# Patient Record
Sex: MALE | Race: WHITE | NOT HISPANIC OR LATINO | Employment: FULL TIME | URBAN - METROPOLITAN AREA
[De-identification: names, ages, dates, MRNs, and addresses within clinical notes are randomized per-mention and may not be internally consistent; named-entity substitution may affect disease eponyms.]

---

## 2017-10-17 ENCOUNTER — OFFICE VISIT (OUTPATIENT)
Dept: CARDIOLOGY | Facility: CLINIC | Age: 62
End: 2017-10-17

## 2017-10-17 VITALS
SYSTOLIC BLOOD PRESSURE: 120 MMHG | DIASTOLIC BLOOD PRESSURE: 72 MMHG | HEART RATE: 60 BPM | HEIGHT: 72 IN | BODY MASS INDEX: 27.22 KG/M2 | WEIGHT: 201 LBS

## 2017-10-17 DIAGNOSIS — I25.10 CORONARY ARTERY DISEASE INVOLVING NATIVE CORONARY ARTERY OF NATIVE HEART WITHOUT ANGINA PECTORIS: Primary | ICD-10-CM

## 2017-10-17 DIAGNOSIS — E78.5 HYPERLIPIDEMIA, UNSPECIFIED HYPERLIPIDEMIA TYPE: ICD-10-CM

## 2017-10-17 DIAGNOSIS — Z95.5 S/P DRUG ELUTING CORONARY STENT PLACEMENT: ICD-10-CM

## 2017-10-17 DIAGNOSIS — I10 ESSENTIAL HYPERTENSION: ICD-10-CM

## 2017-10-17 DIAGNOSIS — R06.81 APNEA: ICD-10-CM

## 2017-10-17 PROCEDURE — 99204 OFFICE O/P NEW MOD 45 MIN: CPT | Performed by: INTERNAL MEDICINE

## 2017-10-17 PROCEDURE — 93000 ELECTROCARDIOGRAM COMPLETE: CPT | Performed by: INTERNAL MEDICINE

## 2017-10-17 RX ORDER — CLOPIDOGREL BISULFATE 75 MG/1
75 TABLET ORAL
COMMUNITY
Start: 2017-04-23 | End: 2018-04-23

## 2017-10-17 RX ORDER — LISINOPRIL 40 MG/1
40 TABLET ORAL
COMMUNITY
End: 2022-04-02 | Stop reason: HOSPADM

## 2017-10-17 RX ORDER — METOPROLOL SUCCINATE 50 MG/1
50 TABLET, EXTENDED RELEASE ORAL DAILY
COMMUNITY
Start: 2017-04-10 | End: 2022-04-02 | Stop reason: HOSPADM

## 2017-10-17 RX ORDER — PREDNISONE 20 MG/1
20 TABLET ORAL DAILY
COMMUNITY
End: 2018-04-18 | Stop reason: ALTCHOICE

## 2017-10-17 RX ORDER — ATORVASTATIN CALCIUM 80 MG/1
80 TABLET, FILM COATED ORAL
COMMUNITY

## 2017-10-17 RX ORDER — AMLODIPINE BESYLATE 5 MG/1
5 TABLET ORAL
COMMUNITY
End: 2022-04-02 | Stop reason: HOSPADM

## 2017-10-17 RX ORDER — ASPIRIN 81 MG/1
81 TABLET ORAL
COMMUNITY
Start: 2017-01-16

## 2017-10-17 RX ORDER — NITROGLYCERIN 0.4 MG/1
0.4 TABLET SUBLINGUAL
COMMUNITY
End: 2022-05-25 | Stop reason: ALTCHOICE

## 2017-10-17 RX ORDER — PANTOPRAZOLE SODIUM 40 MG/1
1 TABLET, DELAYED RELEASE ORAL
COMMUNITY
Start: 2017-03-06

## 2017-10-17 NOTE — PROGRESS NOTES
Subjective:     Encounter Date:10/17/2017      Patient ID: Carlos Alan is a 61 y.o. male.    Chief Complaint:  History of Present Illness    This is a 61-year-old male with a history of coronary artery disease status post drug eluting stent placement of his mid right coronary artery in 3/2016, hyperlipidemia, hypertension, prior tobacco use, remote history of a single episode of atrial fibrillation, acid reflux, who presents to [facility] care.      The patient reports that he initially presented after having a couple episodes of significant dyspnea with routine exertional activities.  The symptoms were significant enough that he sought evaluation with Dr. Swann.  He reports he was subsequently referred to a cardiologist who recommended proceeding directly with cardiac catheterization.  He underwent cardiac catheterization on 3/22/2016 with Dr. Cox.  His coronary angiogram showed normal left main, no significant disease of the LAD with severe proximal calcification, normal appearing left circumflex artery, and a 99% mid right coronary artery stenosis with significant thrombus burden.  There is ARMEN 1-2 flow beyond the stenosis and there was retrograde filling of the PDA and YARELY branches from left to right collaterals.  He ultimately underwent drug-eluting stent placement with a Promus 4.0 x 20 mm stent.  He was started on Prasugrel following his intervention in addition to amlodipine for persistent hypertension.  An echocardiogram performed prior to that in 2/2016 showed normal left ventricular systolic function and wall motion with an ejection fraction of 60%, moderate mitral regurgitation, mild tricuspid regurgitation.    Following that the patient ended up switching his care to Dr. Au who we initially saw in 1/2017.  He then saw Dr. Robles in 4/2017 in follow-up at which time his Prasugrel was switched to clopidogrel.  No changes were made to his management at that time.    In addition to his history  of coronary artery disease he also reports a episode of atrial fibrillation that occurred about 20 years ago associated with symptoms of significant palpitations.  He's had no recurrent episodes that he is aware of since.  He also reports a significant family history of coronary artery disease including his father started having coronary disease in his 60s and a sister who had her first cardiac issues before the age of 60.  He admits to a unhealthy lifestyle prior to his diagnosis of coronary artery disease.  He reports that he smoked and drank a great deal.  He was also overweight and did not exercise.  He is since quit smoking.  He has lost a great deal of weight.  However he admits that he has room for improvement when it comes to diet and exercise.  He does not exercise on a regular basis.  He reports he does some light weight lifting but not any real aerobic activity.  He does report that he gets about 10,000 steps a day.  He otherwise feels well.  He denies any chest pain, shortness of breath, PND or orthopnea, presyncope or syncope, lower extremity edema, or palpitations.  He did have a recent lipid panel that showed a total cholesterol of 160, HDL of 73, triglycerides 136, and LDL of 60.  She has several patients today about diet, recommendations for exercise, and symptoms to look out for in the future for further cardiac events.  He also reports that he's been told by his significant other that he often appears to stop breathing while he is asleep.  He is interested in pursuing a sleep apnea evaluation.    Review of Systems   Constitution: Negative for weakness and malaise/fatigue.   HENT: Negative for hearing loss, hoarse voice, nosebleeds and sore throat.    Eyes: Negative for pain.   Cardiovascular: Negative for chest pain, claudication, cyanosis, dyspnea on exertion, irregular heartbeat, leg swelling, near-syncope, orthopnea, palpitations, paroxysmal nocturnal dyspnea and syncope.   Respiratory:  Negative for shortness of breath and snoring.    Endocrine: Negative for cold intolerance, heat intolerance, polydipsia, polyphagia and polyuria.   Skin: Negative for itching and rash.   Musculoskeletal: Negative for arthritis, falls, joint pain, joint swelling, muscle cramps, muscle weakness and myalgias.   Gastrointestinal: Negative for constipation, diarrhea, dysphagia, heartburn, hematemesis, hematochezia, melena, nausea and vomiting.   Genitourinary: Negative for frequency, hematuria and hesitancy.   Neurological: Negative for excessive daytime sleepiness, dizziness, headaches, light-headedness and numbness.   Psychiatric/Behavioral: Negative for depression. The patient is not nervous/anxious.           Current Outpatient Prescriptions:   •  amLODIPine (NORVASC) 5 MG tablet, Take 5 mg by mouth., Disp: , Rfl:   •  aspirin 81 MG EC tablet, Take 81 mg by mouth., Disp: , Rfl:   •  atorvastatin (LIPITOR) 80 MG tablet, Take 80 mg by mouth., Disp: , Rfl:   •  clopidogrel (PLAVIX) 75 MG tablet, Take 75 mg by mouth., Disp: , Rfl:   •  lisinopril (PRINIVIL,ZESTRIL) 40 MG tablet, Take 40 mg by mouth., Disp: , Rfl:   •  metoprolol succinate XL (TOPROL-XL) 50 MG 24 hr tablet, Take 50 mg by mouth., Disp: , Rfl:   •  nitroglycerin (NITROSTAT) 0.4 MG SL tablet, Place 0.4 mg under the tongue., Disp: , Rfl:   •  pantoprazole (PROTONIX) 40 MG EC tablet, Take 1 tablet by mouth., Disp: , Rfl:   •  predniSONE (DELTASONE) 20 MG tablet, Take 20 mg by mouth Daily., Disp: , Rfl:     Past Medical History:   Diagnosis Date   • Arrhythmia    • Benign hypertension    • CAD (coronary artery disease)     2/p Xience stent to mid RCA in 3/2016   • Hyperlipidemia      History reviewed. No pertinent surgical history.  Family History   Problem Relation Age of Onset   • Heart attack Father    • Heart attack Sister      Social History   Substance Use Topics   • Smoking status: Former Smoker   • Smokeless tobacco: None   • Alcohol use Yes  "          ECG 12 Lead  Date/Time: 10/17/2017 5:17 PM  Performed by: YUNIOR EMERY  Authorized by: YUNIOR EMERY   Comparison: compared with previous ECG   Similar to previous ECG  Rhythm: sinus rhythm               Objective:         Visit Vitals   • /72 (BP Location: Right arm, Patient Position: Sitting)  Comment: lft 118 70   • Pulse 60   • Ht 72\" (182.9 cm)   • Wt 201 lb (91.2 kg)   • BMI 27.26 kg/m2          Physical Exam   Constitutional: He is oriented to person, place, and time. He appears well-developed and well-nourished.   HENT:   Head: Normocephalic and atraumatic.   Eyes: Conjunctivae, EOM and lids are normal. Pupils are equal, round, and reactive to light.   Neck: Normal range of motion and full passive range of motion without pain. Neck supple. No JVD present. Carotid bruit is not present.   Cardiovascular: Normal rate, regular rhythm, S1 normal and S2 normal.  Exam reveals no gallop.    No murmur heard.  Pulses:       Radial pulses are 2+ on the right side, and 2+ on the left side.   No bilateral lower extremity edema   Pulmonary/Chest: Effort normal and breath sounds normal.   Abdominal: Soft. Normal appearance.   Lymphadenopathy:     He has no cervical adenopathy.   Neurological: He is alert and oriented to person, place, and time.   Skin: Skin is warm, dry and intact.   Psychiatric: He has a normal mood and affect.       Lab Review:       Assessment:          Diagnosis Plan   1. Coronary artery disease involving native coronary artery of native heart without angina pectoris  Ambulatory Referral to Sleep Medicine   2. S/P drug eluting coronary stent placement     3. Essential hypertension  Ambulatory Referral to Sleep Medicine   4. Hyperlipidemia, unspecified hyperlipidemia type     5. Apnea  Ambulatory Referral to Sleep Medicine          Plan:       1.  Coronary artery disease.  Prior drug-eluting stent placement to his mid right coronary artery.  He is not exhibiting any symptoms " suggestive of recurrent disease.  We spent 15-20 minutes going over the patient's questions and viewing his history.  I explained that if he were to have recurrent coronary artery disease that he will most likely present with similar symptoms as he had in the past.  We discussed increasing his aerobic activity by starting with walking.  I gave him a goal of exercising about 3 times a week at about 30 minutes at a time to start.  We also went over recommendations for a healthier diet.  Otherwise he appears to be on good medical management.  We'll continue his current medications.  2.  Hypertension.  Well-controlled on his current medications.  We'll continue the same.  3.  Hyperlipidemia.  Lipids are at goal on his high dose of atorvastatin.  We'll contain the same.  I did explain to the patient that if he is able to improve his diet and exercise and lose some weight and that there is a possibility we will be able to come down on the dose of his statin therapy.  4.  Reported nocturnal apneic episodes.  Patient reports that this is been noted by his significant other.  He is interested in pursuing evaluation for sleep apnea.  We'll refer him to sleep medicine.    Will see the patient back again in 6 months.

## 2018-01-17 ENCOUNTER — OFFICE VISIT (OUTPATIENT)
Dept: SLEEP MEDICINE | Facility: HOSPITAL | Age: 63
End: 2018-01-17
Attending: INTERNAL MEDICINE

## 2018-01-17 VITALS
BODY MASS INDEX: 27.63 KG/M2 | DIASTOLIC BLOOD PRESSURE: 77 MMHG | HEART RATE: 65 BPM | SYSTOLIC BLOOD PRESSURE: 138 MMHG | WEIGHT: 204 LBS | HEIGHT: 72 IN

## 2018-01-17 DIAGNOSIS — I25.10 CORONARY ARTERY DISEASE INVOLVING NATIVE HEART WITHOUT ANGINA PECTORIS, UNSPECIFIED VESSEL OR LESION TYPE: ICD-10-CM

## 2018-01-17 DIAGNOSIS — E66.3 OVERWEIGHT (BMI 25.0-29.9): ICD-10-CM

## 2018-01-17 DIAGNOSIS — G47.33 OSA (OBSTRUCTIVE SLEEP APNEA): ICD-10-CM

## 2018-01-17 DIAGNOSIS — I10 ESSENTIAL HYPERTENSION: ICD-10-CM

## 2018-01-17 DIAGNOSIS — R06.83 SNORING: Primary | ICD-10-CM

## 2018-01-17 PROCEDURE — G0463 HOSPITAL OUTPT CLINIC VISIT: HCPCS

## 2018-01-17 NOTE — PROGRESS NOTES
"            [facility]      Patient Care Team:  Usama Swann MD as PCP - General (Internal Medicine)    Referring Provider: Brittani Bentley MD    Chief complaint:   \"I was told he may have sleep apnea\"    History of present illness:    This is a 62-year-old male patient with history of CAD (S/P stent 2 years ago) and HTN (diagnosed 20 years ago) who presented for evaluation of snoring and possible sleep apnea.  He was told by his girlfriend that he may have sleep apnea because of his loud snoring.  She has told him that he stops breathing at night as well.  Patient denies awakening with snoring or gasping but reported occasional coughing and choking at night which has gotten better after he started Protonix.  He reported grinding his teeth at night.  Otherwise, he denies morning headache, dry throat, symptoms of RLS.  He did report daytime fatigue and occasional hypersomnolence especially when he's at work.  However, he attributed that to his \"boring job\".  He does work as a  and sit in his desk typing most of the time.    Sleep schedule:  -Bedtime: 8 PM  -Sleep latency: Few minutes  -Wake up time: 4:30 AM , does feel refreshed  -Nocturnal awakenin-2 times because of nocturia.  No difficulties going back to sleep.  -Perceived sleep hours: 8 on the weekdays and 10 on the weekends.      ESS: Total score: 4     Review of Systems  Constitutional: No fever or chills. No changes in appetite.   ENMT: No sinus congestion, postnasal drip, hoarsness  Cardiovascular: No chest pain but reported irregular heartbeats and swollen legs.  Respiratory: Dyspnea but no cough or wheezing.  (40 packs year smoker)  Gastrointestinal: No constipation, diarrhea, abdominal pain or acid reflux  Neurology: No headache, weakness, numbness or dizziness.   Musculoskeletal: No joints pain, stiffness or swelling.   Psychiatry: No depression, anxiety or irritability.   Hem/Lymphatic: No swollen glands " "or easy bruising.  Integumentary: No rash.  Endocrinology: No excessive thirst, cold or warm intolerance.   Urinary: No dysuria, bloody urine or frequent urination.     History  Past Medical History:   Diagnosis Date   • Arrhythmia    • Benign hypertension    • CAD (coronary artery disease)     2/p Xience stent to mid RCA in 3/2016   • Hyperlipidemia    , No past surgical history on file.,   Family History   Problem Relation Age of Onset   • Heart attack Father    • Heart attack Sister    ,   Social History   Substance Use Topics   • Smoking status: Former Smoker   • Smokeless tobacco: Not on file   • Alcohol use Yes    and Allergies:  Review of patient's allergies indicates no known allergies.    Medications:    Current Outpatient Prescriptions:   •  amLODIPine (NORVASC) 5 MG tablet, Take 5 mg by mouth., Disp: , Rfl:   •  aspirin 81 MG EC tablet, Take 81 mg by mouth., Disp: , Rfl:   •  atorvastatin (LIPITOR) 80 MG tablet, Take 80 mg by mouth., Disp: , Rfl:   •  clopidogrel (PLAVIX) 75 MG tablet, Take 75 mg by mouth., Disp: , Rfl:   •  lisinopril (PRINIVIL,ZESTRIL) 40 MG tablet, Take 40 mg by mouth., Disp: , Rfl:   •  metoprolol succinate XL (TOPROL-XL) 50 MG 24 hr tablet, Take 50 mg by mouth., Disp: , Rfl:   •  nitroglycerin (NITROSTAT) 0.4 MG SL tablet, Place 0.4 mg under the tongue., Disp: , Rfl:   •  pantoprazole (PROTONIX) 40 MG EC tablet, Take 1 tablet by mouth., Disp: , Rfl:   •  predniSONE (DELTASONE) 20 MG tablet, Take 20 mg by mouth Daily., Disp: , Rfl:       Vital Signs:  Vitals:    01/17/18 1552   BP: 138/77   BP Location: Left arm   Patient Position: Sitting   Pulse: 65   Weight: 92.5 kg (204 lb)   Height: 182.9 cm (72\")     Body mass index is 27.67 kg/(m^2).  Neck Circumference: 16 inches     Physical Exam:  Neck Circumference: 16 inches     Constitutional: Not in acute distress.  Eyes: Injected conjunctiva, EOMI.  ENMT: Ly score 4. Mallampati score 4.  Neck: Large.    Heart: Regular rhythm and " rate, no murmur  Lungs: Good and equal air entry bilaterally. No crackles or wheezing.         Abdomen: Obese.   Extremities: No cyanosis, clubbing or pitting edema. Moves all extremities.  Neuro: Conscious, alert, oriented x3.   Psych: Appropriate mood and affect.    Integumentary: No rash  Lymphatic: No palpable cervical or supraclavicular lymph nodes.        Assessment:  Diagnoses and all orders for this visit:    Snoring    Essential hypertension    Overweight (BMI 25.0-29.9)    Coronary artery disease involving native heart without angina pectoris, unspecified vessel or lesion type    Overall, patient is at moderate risk for obstructive sleep apnea due to his upper airway anatomy.  He does have comorbid hypertension and coronary artery disease.    Plan:  I explained the pathophysiology of obstructive sleep apnea, testing and therapy.  We discussed treatment with CPAP and oral appliance in addition to weight loss.  Patient is agreeable with therapy.  Will proceed with home sleep apnea testing and likely CPAP treatment if his sleep apnea is moderate to severe, otherwise he will have to return to the clinic to discuss treatment with oral appliance versus CPAP.    I counseled the patient for weight loss.  I informed him that losing weight may decrease the severity of sleep apnea or obesity need of CPAP therapy.    Also discussed estrogen between obstructive sleep apnea and cardiovascular disease and the beneficial effect of CPAP therapy in reducing the risk of cardiovascular events.        Lakeisha Weston MD  01/17/18  5:03 PM

## 2018-01-29 ENCOUNTER — HOSPITAL ENCOUNTER (OUTPATIENT)
Dept: SLEEP MEDICINE | Facility: HOSPITAL | Age: 63
Discharge: HOME | End: 2018-01-29
Attending: INTERNAL MEDICINE | Admitting: INTERNAL MEDICINE

## 2018-01-29 DIAGNOSIS — G47.33 OSA (OBSTRUCTIVE SLEEP APNEA): ICD-10-CM

## 2018-01-29 PROCEDURE — 95806 SLEEP STUDY UNATT&RESP EFFT: CPT

## 2018-02-06 ENCOUNTER — TELEPHONE (OUTPATIENT)
Dept: SLEEP MEDICINE | Facility: HOSPITAL | Age: 63
End: 2018-02-06

## 2018-02-28 ENCOUNTER — OFFICE VISIT (OUTPATIENT)
Dept: SLEEP MEDICINE | Facility: HOSPITAL | Age: 63
End: 2018-02-28
Attending: INTERNAL MEDICINE

## 2018-02-28 VITALS
OXYGEN SATURATION: 97 % | BODY MASS INDEX: 27.83 KG/M2 | DIASTOLIC BLOOD PRESSURE: 69 MMHG | WEIGHT: 205.5 LBS | TEMPERATURE: 98.9 F | HEIGHT: 72 IN | HEART RATE: 68 BPM | SYSTOLIC BLOOD PRESSURE: 107 MMHG

## 2018-02-28 DIAGNOSIS — G47.33 OBSTRUCTIVE SLEEP APNEA, ADULT: Primary | ICD-10-CM

## 2018-02-28 PROCEDURE — G0463 HOSPITAL OUTPT CLINIC VISIT: HCPCS

## 2018-02-28 NOTE — PROGRESS NOTES
"                      [facility]                          Chief Complaint:   Follow up for the result of the sleep study    History of present illness:   This is a 62-year-old male patient with a history of CAD and HTN.  He was last seen in January for concerns about sleep apnea given his CAD history.  He is here today for follow-up on the result of the sleep test.  He continues to report snoring which disturbs his bed partner.  Otherwise, his symptoms has not changed.    Sleep schedule:  -Bedtime: 10 PM  -Wake up time: 5 AM , does feel refreshed  -Nocturnal awakening: Multiple times because of unknown reason.  No difficulties going back to sleep.  -Perceived sleep hours: 6      ESS: Total score: 4         Medication Review:     Current Outpatient Prescriptions:   •  amLODIPine (NORVASC) 5 MG tablet, Take 5 mg by mouth., Disp: , Rfl:   •  aspirin 81 MG EC tablet, Take 81 mg by mouth., Disp: , Rfl:   •  atorvastatin (LIPITOR) 80 MG tablet, Take 80 mg by mouth., Disp: , Rfl:   •  clopidogrel (PLAVIX) 75 MG tablet, Take 75 mg by mouth., Disp: , Rfl:   •  lisinopril (PRINIVIL,ZESTRIL) 40 MG tablet, Take 40 mg by mouth., Disp: , Rfl:   •  metoprolol succinate XL (TOPROL-XL) 50 MG 24 hr tablet, Take 50 mg by mouth., Disp: , Rfl:   •  nitroglycerin (NITROSTAT) 0.4 MG SL tablet, Place 0.4 mg under the tongue., Disp: , Rfl:   •  pantoprazole (PROTONIX) 40 MG EC tablet, Take 1 tablet by mouth., Disp: , Rfl:   •  predniSONE (DELTASONE) 20 MG tablet, Take 20 mg by mouth Daily., Disp: , Rfl:         Vital Signs:  Vitals:    02/28/18 1536   BP: 107/69   BP Location: Left arm   Patient Position: Sitting   Cuff Size: Adult   Pulse: 68   Temp: 98.9 °F (37.2 °C)   TempSrc: Oral   SpO2: 97%   Weight: 93.2 kg (205 lb 8 oz)   Height: 182.9 cm (72\")     Body mass index is 27.87 kg/(m^2).  Neck Circumference: 15.5 inches       Physical Exam:   General Appearance:    Alert, cooperative, in no acute " distress   HEENT:  Ly score 4. Mallampati score 4.   Lungs:     Clear to auscultation,respirations regular, even and                  unlabored    Heart:    Regular rhythm and normal rate, normal S1 and S2, no            murmur, no gallop, no rub, no click   Abdomen:     Normal bowel sounds, no masses, no organomegaly, soft        non-tender, non-distended, no guarding, no rebound                tenderness   Neuro:   Conscious, alert, oriented x3. Appropriate mood and affect.    Extremities:   Moves all extremities well, no edema, no cyanosis, no             Redness              Diagnostic data:  HST was performed on: 1/30/18  TRT (total recording time)= 497 min; MT (monitoring time)= 483 min  Patient experienced 26 obstructive apnea, 0 central apnea and 35 hypopneas resulting in total SHAMEKA: 7.6 events/h. Supine time was 103 minutes resulting in Supine SHAMEKA: 13.9 events/hour.  LUCA=4.8 events/h; [place] SpO2=86 % and hypoxic burden: 1.2 min.         ASSESSMENT:      Diagnoses and all orders for this visit:    Obstructive sleep apnea, adult      PLAN:  I explained the result of the sleep study in details.  I offered treatment with CPAP versus oral appliance due to his comorbid coronary artery disease and hypertension.  I explained the data about the association between sleep apnea and coronary artery disease and major cardiovascular events.  Patient would like to try weight loss and stop alcohol rather than using CPAP or oral appliance.  I recommended to increase his efforts to lose weight.  We will follow him as needed.                This note was dictated utilizing listedplaceson dictation

## 2018-04-18 ENCOUNTER — OFFICE VISIT (OUTPATIENT)
Dept: CARDIOLOGY | Facility: CLINIC | Age: 63
End: 2018-04-18

## 2018-04-18 VITALS
DIASTOLIC BLOOD PRESSURE: 68 MMHG | HEART RATE: 61 BPM | BODY MASS INDEX: 27.9 KG/M2 | HEIGHT: 72 IN | WEIGHT: 206 LBS | SYSTOLIC BLOOD PRESSURE: 118 MMHG

## 2018-04-18 DIAGNOSIS — I25.10 CORONARY ARTERY DISEASE INVOLVING NATIVE CORONARY ARTERY OF NATIVE HEART WITHOUT ANGINA PECTORIS: Primary | ICD-10-CM

## 2018-04-18 DIAGNOSIS — I48.0 PAROXYSMAL ATRIAL FIBRILLATION (HCC): ICD-10-CM

## 2018-04-18 PROCEDURE — 99215 OFFICE O/P EST HI 40 MIN: CPT | Performed by: INTERNAL MEDICINE

## 2018-04-18 PROCEDURE — 93000 ELECTROCARDIOGRAM COMPLETE: CPT | Performed by: INTERNAL MEDICINE

## 2018-09-25 ENCOUNTER — OFFICE VISIT (OUTPATIENT)
Dept: CARDIOLOGY | Facility: CLINIC | Age: 63
End: 2018-09-25

## 2018-09-25 VITALS
SYSTOLIC BLOOD PRESSURE: 152 MMHG | WEIGHT: 205 LBS | HEART RATE: 58 BPM | DIASTOLIC BLOOD PRESSURE: 104 MMHG | BODY MASS INDEX: 27.77 KG/M2 | HEIGHT: 72 IN

## 2018-09-25 DIAGNOSIS — I10 ESSENTIAL HYPERTENSION: Primary | ICD-10-CM

## 2018-09-25 DIAGNOSIS — E83.42 HYPOMAGNESEMIA: ICD-10-CM

## 2018-09-25 DIAGNOSIS — E78.5 HYPERLIPIDEMIA, UNSPECIFIED HYPERLIPIDEMIA TYPE: ICD-10-CM

## 2018-09-25 DIAGNOSIS — I25.10 CORONARY ARTERY DISEASE INVOLVING NATIVE CORONARY ARTERY OF NATIVE HEART WITHOUT ANGINA PECTORIS: ICD-10-CM

## 2018-09-25 DIAGNOSIS — R00.2 PALPITATIONS: ICD-10-CM

## 2018-09-25 PROCEDURE — 93000 ELECTROCARDIOGRAM COMPLETE: CPT | Performed by: NURSE PRACTITIONER

## 2018-09-25 PROCEDURE — 99214 OFFICE O/P EST MOD 30 MIN: CPT | Performed by: NURSE PRACTITIONER

## 2018-09-25 RX ORDER — AMOXICILLIN 500 MG/1
CAPSULE ORAL
COMMUNITY
Start: 2018-06-19 | End: 2019-04-22

## 2018-09-25 RX ORDER — CLOPIDOGREL BISULFATE 75 MG/1
TABLET ORAL
COMMUNITY
Start: 2018-07-10 | End: 2020-02-06

## 2018-09-25 RX ORDER — CLOPIDOGREL BISULFATE 75 MG/1
75 TABLET ORAL
COMMUNITY
Start: 2017-04-23 | End: 2018-09-25 | Stop reason: SDUPTHER

## 2018-09-25 NOTE — PROGRESS NOTES
Date of Office Visit: 2018  Encounter Provider: SIMON Aaron  Place of Service: [facility] CARDIOLOGY  Patient Name: Carlos Alan  :1955      Subjective:     Chief Complaint: ED follow up for palpitations      History of Present Illness:  Carlos Alan is a pleasant 62 y.o. male who is new to me.  Outside records have been obtained and reviewed by me.  The patient has a past medical history of hypertension, single episode of atrial fibrillation, coronary artery disease, hyperlipidemia, former smoker and alcohol use.     The patient was last seen in the office on 18 by Dr. Carpio.  He was previously followed by Dr. Chapa from our practice but decided he wanted to see Dr. Carpio for second opinion.  At his visit with Dr. Carpio he reported that he has a stressful life.  He is a former smoker but quit in .  He reports he drinks alcohol daily.  He reported he has a family history of heart disease (father and sister).  He reports he gets gout a few times a year.  He also reports he is treated by his PCP for hypertension and hyperlipidemia.  At this visit it was noted he has had an episode of transient atrial fibrillation that occurred once and he has not had any recurrence since.  He also has a cardiac history of coronary artery disease status post stenting of a critical mid RCA lesion in 2016.  He also had calcification of his LAD without significant stenosis.  At this visit with Dr. Carpio, there was a lengthy discussion about coronary prevention including lifestyle and medication treatments.  Dr. Carpoi stated that given his high level physical activity that he was reassured that he did not think he was likely suffering from significant ischemia currently.  Dr. Carpio also did not feel that the patient needed to have any anticoagulation as he had a single episode of atrial fibrillation in the past with no recurrence.  He recommended that he see Dr. Carpio on a once yearly  basis or sooner if symptoms warranted.    The patient is here today for follow-up because he was recently in the emergency department for palpitations on 9/13/18.  He reported that around 2 AM when he woke up to the bathroom he rolled onto his left side and he can feel his heart rate. His heart monitor read that his heart rate was between 96 and 97 and showed no A. Fib.  He eased back down and fell back asleep.  He woke back up on his usual time at 5 AM and couldn't feel his pulse and his head.  His heart rate was around  bpm and this device read that his heart rate was unclassified.  This is when he proceeded to go to the emergency department at [facility].  After presenting to the ED he had no further palpitations or elevations in his heart rate.  He denied any chest pain, shortness of breath, headache, dizziness, exertional symptoms, fever, chills, cough, congestion, nausea, vomiting or diarrhea.  His troponin was negative and his lab workup was essentially unremarkable except for a mildly low magnesium level of 1.39 (1.40).  It also showed a mild anemia H&H 12.6 and 38.2.  No arrhythmias in the ED.  He was discharged home with instructions to follow-up with his cardiologist.  The patient reports that he has also been having some elevated blood pressure readings as well as office visits.  His blood pressure in the ED was 150/76.  His heart rate was 80s and sinus rhythm.  He reports he doesn't check his blood pressure at home often but lately when he has it's been running 150s over 80s to 90s where before it was always staying about 120s over 80s.  He also states that he was worried because prior to his last stress test which resulted in stent placement in his blood pressure was starting to get elevated.  He also mentioned that although he does exercise frequently he does not try to exert himself for long periods of time and that while he was babysitting a few weeks ago he did some running around the  bed and started to sweat a little bit which she did not feel like his level of activity should have caused mild sweating.  He has had no recurrence of the sweating episodes since.  He walks 2 times a week every other week where he gets his heart rate to 110 bpm for about 10 minutes.  He does not have any chest pain, abnormal diaphoresis, shortness of breath or dizziness with this activity.  Also, the patient reported that he had a sleep study about 6 months ago which reported borderline sleep apnea.  It was discussed with the sleep medicine physician that the patient cut back on his drinking and lost about 20 pounds that he would not need a CPAP.  However the patient reported that he really has not done a great job of cutting back on his alcohol intake or trying to lose the weight.  He reports he drinks a glass of wine daily and at approximately 6 drinks of hard liquor per day on the weekend.  He also reports his caffeine intake is limited to maybe 1 soft drink soda per week.      Past Medical History:   Diagnosis Date   • Arrhythmia    • Benign hypertension    • CAD (coronary artery disease)     2/p Xience stent to mid RCA in 3/2016   • Coronary artery disease involving native coronary artery of native heart without angina pectoris    • Hyperlipidemia    • S/P drug eluting coronary stent placement      History reviewed. No pertinent surgical history.  Outpatient Medications Prior to Visit   Medication Sig Dispense Refill   • amLODIPine (NORVASC) 5 MG tablet Take 5 mg by mouth.     • aspirin 81 MG EC tablet Take 81 mg by mouth.     • atorvastatin (LIPITOR) 80 MG tablet Take 80 mg by mouth.     • lisinopril (PRINIVIL,ZESTRIL) 40 MG tablet Take 40 mg by mouth.     • metoprolol succinate XL (TOPROL-XL) 50 MG 24 hr tablet Take 50 mg by mouth.     • nitroglycerin (NITROSTAT) 0.4 MG SL tablet Place 0.4 mg under the tongue.     • pantoprazole (PROTONIX) 40 MG EC tablet Take 1 tablet by mouth.       No facility-administered  medications prior to visit.        Allergies as of 09/25/2018   • (No Known Allergies)     Social History     Social History   • Marital status: Single     Spouse name: N/A   • Number of children: N/A   • Years of education: N/A     Occupational History   • Not on file.     Social History Main Topics   • Smoking status: Former Smoker   • Smokeless tobacco: Not on file   • Alcohol use 8.4 oz/week     7 Glasses of wine, 7 Shots of liquor per week      Comment: red wine daily and cocktails on the weekends   • Drug use: No   • Sexual activity: Defer     Other Topics Concern   • Not on file     Social History Narrative   • No narrative on file     Family History   Problem Relation Age of Onset   • Heart attack Father    • Heart attack Sister      Review of Systems   Constitution: Negative for chills, fever and malaise/fatigue.   HENT: Negative for ear pain, hearing loss, nosebleeds and sore throat.    Eyes: Negative for double vision, pain, vision loss in left eye and vision loss in right eye.   Cardiovascular: Negative for chest pain, claudication, dyspnea on exertion, irregular heartbeat, leg swelling, near-syncope, orthopnea, palpitations, paroxysmal nocturnal dyspnea and syncope.   Respiratory: Positive for snoring. Negative for cough, shortness of breath and wheezing.    Endocrine: Negative for cold intolerance and heat intolerance.   Hematologic/Lymphatic: Negative for bleeding problem.   Skin: Negative for color change, itching, rash and unusual hair distribution.   Musculoskeletal: Negative for joint pain and joint swelling.   Gastrointestinal: Negative for abdominal pain, diarrhea, hematochezia, melena, nausea and vomiting.   Genitourinary: Negative for decreased libido, frequency, hematuria, hesitancy and incomplete emptying.   Neurological: Negative for excessive daytime sleepiness, dizziness, headaches, light-headedness, loss of balance, numbness, paresthesias and seizures.   Psychiatric/Behavioral: Negative  "for depression.          Objective:     Vitals:    09/25/18 1348   BP: (!) 152/104   BP Location: Left arm   Patient Position: Sitting   Pulse: 58   Weight: 93 kg (205 lb)   Height: 182.9 cm (72\")     Body mass index is 27.8 kg/m².    PHYSICAL EXAM:  Physical Exam   Constitutional: He is oriented to person, place, and time. He appears well-developed and well-nourished. No distress.   HENT:   Head: Normocephalic and atraumatic.   Eyes: Pupils are equal, round, and reactive to light. Right eye exhibits abnormal extraocular motion.   Neck: Neck supple. No JVD present. Carotid bruit is not present.   Cardiovascular: Regular rhythm, S1 normal, S2 normal, normal heart sounds and intact distal pulses.  Bradycardia present.  Exam reveals no friction rub.    No murmur heard.  Pulses:       Radial pulses are 2+ on the right side, and 2+ on the left side.   Pulmonary/Chest: Effort normal and breath sounds normal. No accessory muscle usage. No respiratory distress. He has no decreased breath sounds. He has no wheezes. He has no rhonchi. He has no rales. He exhibits no tenderness.   Abdominal: Soft. Bowel sounds are normal. He exhibits no distension. There is no splenomegaly or hepatomegaly. There is no tenderness. There is no rebound.   Overweight   Musculoskeletal: Normal range of motion. He exhibits no edema.   Neurological: He is alert and oriented to person, place, and time.   Skin: Skin is warm, dry and intact. No bruising and no rash noted. He is not diaphoretic. No erythema.   Psychiatric: He has a normal mood and affect. His speech is normal and behavior is normal. Judgment and thought content normal. Cognition and memory are normal.   Nursing note and vitals reviewed.        ECG 12 Lead  Date/Time: 9/25/2018 2:04 PM  Performed by: WAYNE DOE  Authorized by: WAYNE DOE   Comparison: compared with previous ECG from 4/18/2018  Similar to previous ECG  Rhythm: sinus bradycardia  Rate: bradycardic  BPM: " 58  Clinical impression: normal ECG  Comments: Otherwise normal ECG  Indication: CAD, HTN            Assessment:       Diagnosis Plan   1. Essential hypertension     2. Palpitations     3. Coronary artery disease involving native coronary artery of native heart without angina pectoris     4. Hyperlipidemia, unspecified hyperlipidemia type     5. Hypomagnesemia         Plan:     1. Hypertension: B/P 151/104 in office today - denies chest pain, headache or visual changes. B/P running in the 150s systolic and 90s diastolic at home and at other physician office.  I will increase Amlodipine to 10 mg daily and Continue the rest of meds the same. He reports he will have his PCP order his increased dose of Amlodipine when he finishes the 5 mg tablets. His HR is 58 today. If we need further med titration for hypertension management. I will consider increasing his Toprol XL dose to 75 mg daily and see if this prevents any recurrence of palpitations. I gave the patient a b/p log to check is b/p and bring it in to his follow up visit with me in 6 weeks to reassess his symptoms and see if his b/p has improved with his increased dose of Amlodipine. I also advised the patient that he should cut back on his ETOH intake and sodium intake for b/p reduction. I also advised the patient that exercising and weight reduction are important factors in improving hypertension. The patient demonstrated understanding. I told him if his borderline NED continued to be left untreated this could also worsen his hypertension. He again demonstrated understanding.     2.   Palpitations: Had episode that prompted him to go to [facility] on 9/13/18. Trop was negative. Was not in A-fib. Was d/c home to follow up with cardiology and for possible Holter. Has had no recurrence since. I discussed the possibility of Holter monitor, Ziopatch, Linq implantation for detection of arrhythmias or atrial fibrillation.  I also discussed watchful waiting  with the patient.  At this time the patient opted for watchful waiting. If we proceed with monitoring in the future- per pre-certification department the patient will need a holter monitor before a ziopatch.    3. Coronary Artery Disease  Assessment  • The patient has no angina    Subjective - Objective  • Current antiplatelet therapy includes aspirin 81 mg and clopidogrel 75 mg    I do not feel like the patient needs an ischemic work up at this time based on his activity level and no other anginal symptoms aside from one isolated episode of mild sweating without chest pain or shortness of breath. His EKG is normal and troponin was negative on 9/13/18 in the ED.     4. Hyperlipidemia- On Lipitor 80 mg daily. Managed my Dr. Swann his PCP. Reports he had labs in July 2018. I do not have these results.    5. Hypomagnesemia- Mg level mildly low in ED at [facility] on 9/13/18 was 1.39 (1.40). The patient has not yet followed up with his PCP. I advised him to schedule follow up with his PCP after his ED visit for recheck of labs.       Follow up with SIMON Stout in 6 weeks, unless otherwise needed sooner.  I advised the patient to contact our office with any questions or concerns.         Your medication list          Accurate as of 9/25/18  3:16 PM. If you have any questions, ask your nurse or doctor.               CHANGE how you take these medications      Instructions Last Dose Given Next Dose Due   clopidogrel 75 MG tablet  Commonly known as:  PLAVIX  What changed:  Another medication with the same name was removed. Continue taking this medication, and follow the directions you see here.  Changed by:  SIMON Aaron              CONTINUE taking these medications      Instructions Last Dose Given Next Dose Due   amLODIPine 5 MG tablet  Commonly known as:  NORVASC      Take 5 mg by mouth.       amoxicillin 500 MG capsule  Commonly known as:  AMOXIL           aspirin 81 MG EC tablet      Take 81 mg by  mouth.       atorvastatin 80 MG tablet  Commonly known as:  LIPITOR      Take 80 mg by mouth.       lisinopril 40 MG tablet  Commonly known as:  PRINIVIL,ZESTRIL      Take 40 mg by mouth.       metoprolol succinate XL 50 MG 24 hr tablet  Commonly known as:  TOPROL-XL      Take 50 mg by mouth.       nitroglycerin 0.4 MG SL tablet  Commonly known as:  NITROSTAT      Place 0.4 mg under the tongue.       pantoprazole 40 MG EC tablet  Commonly known as:  PROTONIX      Take 1 tablet by mouth.              The above medication changes may not have been made by this provider.  Medication list was updated to reflect medications patient is currently taking including medication changes and discontinuations made by other healthcare providers.       I have reviewed this case with Dr. Carpio. It has been a pleasure to participate in this patient's care. Please feel free to contact me with any questions or concerns.     Radha Gruber, SIMON  09/25/2018       Dictated utilizing Dragon Dictation System.

## 2019-04-22 ENCOUNTER — OFFICE VISIT (OUTPATIENT)
Dept: CARDIOLOGY | Facility: CLINIC | Age: 64
End: 2019-04-22

## 2019-04-22 VITALS
HEART RATE: 66 BPM | HEIGHT: 72 IN | SYSTOLIC BLOOD PRESSURE: 130 MMHG | DIASTOLIC BLOOD PRESSURE: 74 MMHG | BODY MASS INDEX: 27.77 KG/M2 | WEIGHT: 205 LBS

## 2019-04-22 DIAGNOSIS — I48.0 PAROXYSMAL ATRIAL FIBRILLATION (HCC): Primary | ICD-10-CM

## 2019-04-22 DIAGNOSIS — I25.10 CORONARY ARTERY DISEASE INVOLVING NATIVE CORONARY ARTERY OF NATIVE HEART WITHOUT ANGINA PECTORIS: ICD-10-CM

## 2019-04-22 PROCEDURE — 99213 OFFICE O/P EST LOW 20 MIN: CPT | Performed by: INTERNAL MEDICINE

## 2019-04-22 PROCEDURE — 93000 ELECTROCARDIOGRAM COMPLETE: CPT | Performed by: INTERNAL MEDICINE

## 2019-04-30 ENCOUNTER — TELEPHONE (OUTPATIENT)
Dept: CARDIOLOGY | Facility: CLINIC | Age: 64
End: 2019-04-30

## 2019-05-01 NOTE — TELEPHONE ENCOUNTER
Per verbal from Dr. Carpio, patient is clear for surgery and ok to hold aspirin and plavix for 5 days prior.    Patient has been informed and S/C form along with EKG faxed to [phone].

## 2019-10-29 ENCOUNTER — ON CAMPUS - OUTPATIENT (OUTPATIENT)
Dept: URBAN - METROPOLITAN AREA HOSPITAL 134 | Facility: HOSPITAL | Age: 64
End: 2019-10-29

## 2019-10-29 DIAGNOSIS — K62.5 HEMORRHAGE OF ANUS AND RECTUM: ICD-10-CM

## 2019-10-29 DIAGNOSIS — K57.32 DIVERTICULITIS OF LARGE INTESTINE WITHOUT PERFORATION OR ABS: ICD-10-CM

## 2019-10-29 DIAGNOSIS — K64.8 OTHER HEMORRHOIDS: ICD-10-CM

## 2019-10-29 DIAGNOSIS — D12.4 BENIGN NEOPLASM OF DESCENDING COLON: ICD-10-CM

## 2019-10-29 PROCEDURE — 45385 COLONOSCOPY W/LESION REMOVAL: CPT

## 2019-11-14 ENCOUNTER — OFFICE (OUTPATIENT)
Dept: URBAN - METROPOLITAN AREA CLINIC 66 | Facility: CLINIC | Age: 64
End: 2019-11-14

## 2019-11-14 VITALS
DIASTOLIC BLOOD PRESSURE: 80 MMHG | SYSTOLIC BLOOD PRESSURE: 119 MMHG | HEART RATE: 63 BPM | HEIGHT: 72 IN | WEIGHT: 200 LBS

## 2019-11-14 DIAGNOSIS — I10 ESSENTIAL (PRIMARY) HYPERTENSION: ICD-10-CM

## 2019-11-14 DIAGNOSIS — K64.8 OTHER HEMORRHOIDS: ICD-10-CM

## 2019-11-14 DIAGNOSIS — K92.1 MELENA: ICD-10-CM

## 2019-11-14 DIAGNOSIS — K21.9 GASTRO-ESOPHAGEAL REFLUX DISEASE WITHOUT ESOPHAGITIS: ICD-10-CM

## 2019-11-14 PROCEDURE — 99214 OFFICE O/P EST MOD 30 MIN: CPT

## 2020-02-06 ENCOUNTER — OFFICE VISIT (OUTPATIENT)
Dept: CARDIOLOGY | Facility: CLINIC | Age: 65
End: 2020-02-06

## 2020-02-06 VITALS
HEART RATE: 66 BPM | WEIGHT: 201 LBS | DIASTOLIC BLOOD PRESSURE: 86 MMHG | SYSTOLIC BLOOD PRESSURE: 130 MMHG | BODY MASS INDEX: 27.22 KG/M2 | HEIGHT: 72 IN

## 2020-02-06 DIAGNOSIS — I48.0 PAROXYSMAL ATRIAL FIBRILLATION (HCC): ICD-10-CM

## 2020-02-06 DIAGNOSIS — I25.10 CORONARY ARTERY DISEASE INVOLVING NATIVE CORONARY ARTERY OF NATIVE HEART WITHOUT ANGINA PECTORIS: Primary | ICD-10-CM

## 2020-02-06 DIAGNOSIS — I10 ESSENTIAL HYPERTENSION: ICD-10-CM

## 2020-02-06 DIAGNOSIS — E78.2 MIXED HYPERLIPIDEMIA: ICD-10-CM

## 2020-02-06 PROCEDURE — 99214 OFFICE O/P EST MOD 30 MIN: CPT | Performed by: INTERNAL MEDICINE

## 2020-02-06 PROCEDURE — 93000 ELECTROCARDIOGRAM COMPLETE: CPT | Performed by: INTERNAL MEDICINE

## 2020-02-06 RX ORDER — MULTIVITAMIN WITH IRON
TABLET ORAL
COMMUNITY
Start: 2019-12-09 | End: 2022-05-25 | Stop reason: SDUPTHER

## 2020-02-06 RX ORDER — PNV NO.95/FERROUS FUM/FOLIC AC 28MG-0.8MG
TABLET ORAL
COMMUNITY
Start: 2019-12-10

## 2020-02-06 NOTE — PROGRESS NOTES
[facility] Follow Up Office Note     Encounter Date:20  Patient:Carlos Alan  :1955  MRN:[id number]      Chief Complaint: Follow-up coronary artery disease    Chief Complaint   Patient presents with   • Coronary Artery Disease     1 year f/u    • Hypertension     History of Presenting Illness:      Mr. Alan is a 64-year-old gentleman with past medical history notable for coronary artery disease status post percutaneous intervention, hypertension, mixed hyperlipidemia, and remote history of paroxysmal atrial fibrillation who presents to our office for scheduled follow-up.  Patient was previously following with Dr. Carpio and was last seen by him approximately 1 year ago.  He presents today for his scheduled one-year follow-up and his first visit with myself.    In general he has been doing quite well.  At his last office visit no changes were needed at that time.  And he was doing well.  He had no symptoms of paroxysmal atrial fibrillation or recurrent anginal symptoms.  He was tolerating all of his medical regimen including his dual antiplatelet therapy at that time.  In general he has done quite well since then.  He did unfortunately have an episode of rectal bleeding back in 2019 and at that time his clopidogrel was discontinued appropriately.  Since then he has had no recurrent bleeding issues.  He is also denied any recurrent anginal symptoms.    With regards to his paroxysmal atrial fibrillation this was initially found and diagnosed back in  and was relatively self-limited.  He had only had 2 episodes in his life 1 was actually captured on a stress test.  He has had no recurrent symptoms that he is aware of since then.  He does use a home monitor in which he can check his rhythm at home if he feels any abnormal heart rhythms.  He does have occasional short and brief palpitations but nothing sustained.  He denies any TIA or strokelike symptoms.        Review of  Systems:  Review of Systems   Constitution: Negative.   HENT: Negative.    Eyes: Negative.    Cardiovascular: Negative.    Respiratory: Negative.    Endocrine: Negative.    Hematologic/Lymphatic: Positive for bleeding problem.   Skin: Negative.    Musculoskeletal: Negative.    Gastrointestinal: Negative.    Genitourinary: Negative.    Neurological: Negative.    Psychiatric/Behavioral: Negative.    Allergic/Immunologic: Negative.        Current Outpatient Medications on File Prior to Visit   Medication Sig Dispense Refill   • amLODIPine (NORVASC) 5 MG tablet Take 5 mg by mouth.     • aspirin 81 MG EC tablet Take 81 mg by mouth.     • atorvastatin (LIPITOR) 80 MG tablet Take 80 mg by mouth.     • B Complex-C (B-COMPLEX WITH VITAMIN C) tablet      • ferrous sulfate 325 (65 Fe) MG tablet      • lisinopril (PRINIVIL,ZESTRIL) 40 MG tablet Take 40 mg by mouth.     • metoprolol succinate XL (TOPROL-XL) 50 MG 24 hr tablet Take 50 mg by mouth.     • nitroglycerin (NITROSTAT) 0.4 MG SL tablet Place 0.4 mg under the tongue.     • pantoprazole (PROTONIX) 40 MG EC tablet Take 1 tablet by mouth.     • [DISCONTINUED] clopidogrel (PLAVIX) 75 MG tablet        No current facility-administered medications on file prior to visit.        No Known Allergies    Past Medical History:   Diagnosis Date   • Arrhythmia    • Benign hypertension    • CAD (coronary artery disease)     2/p Xience stent to mid RCA in 3/2016   • Coronary artery disease involving native coronary artery of native heart without angina pectoris    • Hyperlipidemia    • S/P drug eluting coronary stent placement        History reviewed. No pertinent surgical history.    Social History     Socioeconomic History   • Marital status: Single     Spouse name: Not on file   • Number of children: Not on file   • Years of education: Not on file   • Highest education level: Not on file   Tobacco Use   • Smoking status: Former Smoker   • Smokeless tobacco: Never Used   • Tobacco  "comment: caffeine use occass   Substance and Sexual Activity   • Alcohol use: Yes     Alcohol/week: 14.0 standard drinks     Types: 7 Glasses of wine, 7 Shots of liquor per week     Comment: red wine daily and cocktails on the weekends   • Drug use: No   • Sexual activity: Defer       Family History   Problem Relation Age of Onset   • Heart attack Father    • Heart attack Sister        The following portions of the patient's history were reviewed and updated as appropriate: allergies, current medications, past family history, past medical history, past social history, past surgical history and problem list.       Objective:       Vitals:    02/06/20 1515   BP: 130/86   BP Location: Left arm   Patient Position: Sitting   Pulse: 66   Weight: 91.2 kg (201 lb)   Height: 182.9 cm (72\")         Physical Exam:  Constitutional: Well appearing, well developed, no acute distress   HENT: Oropharynx clear and membrane moist  Eyes: Normal conjunctiva, no sclera icterus.  Neck: Supple, no carotid bruit bilaterally.  Cardiovascular: Regular rate and rhythm, no murmur, no lower extremity edema.  Pulmonary: Normal respiratory effort, no lung sounds, no wheezing.  Abdominal: Soft, nontender, no hepatosplenomegaly, liver is non-pulsatile.  Neurological: Alert and orient x 3.   Skin: Warm, dry, no ecchymosis, no rash.  Psych: Appropriate mood and affect. Normal judgment and insight.         ECG 12 Lead  Date/Time: 2/6/2020 3:22 PM  Performed by: Young Chapman MD  Authorized by: Young Chapman MD   Comparison: compared with previous ECG from 4/22/2019  Similar to previous ECG  Rhythm: sinus rhythm    Clinical impression: normal ECG        Cardiac catheterization [facility] 3/22/2016:  · Left main angiographically normal  · LAD luminal irregularities  · Circumflex luminal irregularities  · Right coronary artery 99% stenoses with ARMEN II flow, culprit for unstable angina  · Successful percutaneous intervention with " placement of 4.0 x 20 mm Promus drug-eluting stent    Echocardiogram [facility] 2/23/2018:  · Ejection fraction 60%  · Moderate mitral regurgitation  · Right ventricular systolic pressure estimated at 30 to 40%       Assessment:          Diagnosis Plan   1. Coronary artery disease involving native coronary artery of native heart without angina pectoris  ECG 12 Lead   2. Essential hypertension     3. Mixed hyperlipidemia     4. Paroxysmal atrial fibrillation (CMS/[facility])          Plan:       Mr. Alan is a 64-year-old gentleman with past medical history notable for coronary artery disease status post percutaneous intervention, hypertension, mixed hyperlipidemia, and remote history of paroxysmal atrial fibrillation who presents to our office for scheduled follow-up.  Patient was previously following with Dr. Carpio and was last seen by him approximately 1 year ago.  He presents today for his scheduled one-year follow-up and his first visit with myself.  In general he is doing well.  I agree that he can stay off of his clopidogrel for the time being and his recent rectal bleeding.  With regards to his coronary disease he has no anginal symptoms and will continue to monitor.  His blood pressure and heart rate are well controlled.  Will work on obtaining labs from his primary care physician regarding recent lipid panel results.  With regards to his remote history of paroxysmal atrial fibrillation he still maintains a low risk CHADVASC score of 1 and would continue aspirin therapy for the time being and does not warrant anticoagulation at this time.  Given his almost 25 years of no major recurrence of atrial fibrillation even at our next visit 1 year from now I would not necessarily recommend anticoagulation after he turns 65 which would put him at a slightly higher risk with a CHADVASC score of 2 unless any changes in his clinical status.    Coronary artery disease without angina:  · Continue aspirin  · Continue  beta-blocker  · Continue high potency statin    Paroxysmal atrial fibrillation:  · Remote history from 1993  · No recurrent symptoms  · CHADVASC score 1 therefore no anticoagulation    Hypertension:  · Blood pressure well controlled continue with current medical therapy    Mixed hyperlipidemia:  · Can continue high potency statin therapy  · We will obtain labs from primary care physician    Follow-up:  1 year            Young Chapman MD  [facility]  02/06/20  3:28 PM

## 2021-03-16 ENCOUNTER — BULK ORDERING (OUTPATIENT)
Dept: CASE MANAGEMENT | Facility: OTHER | Age: 66
End: 2021-03-16

## 2021-03-16 DIAGNOSIS — Z23 IMMUNIZATION DUE: ICD-10-CM

## 2021-04-28 ENCOUNTER — OFFICE VISIT (OUTPATIENT)
Dept: CARDIOLOGY | Facility: CLINIC | Age: 66
End: 2021-04-28

## 2021-04-28 VITALS
SYSTOLIC BLOOD PRESSURE: 140 MMHG | DIASTOLIC BLOOD PRESSURE: 84 MMHG | HEIGHT: 72 IN | HEART RATE: 66 BPM | WEIGHT: 213 LBS | BODY MASS INDEX: 28.85 KG/M2

## 2021-04-28 DIAGNOSIS — I48.0 PAROXYSMAL ATRIAL FIBRILLATION (HCC): ICD-10-CM

## 2021-04-28 DIAGNOSIS — I25.10 CORONARY ARTERY DISEASE INVOLVING NATIVE CORONARY ARTERY OF NATIVE HEART WITHOUT ANGINA PECTORIS: Primary | ICD-10-CM

## 2021-04-28 DIAGNOSIS — E78.2 MIXED HYPERLIPIDEMIA: ICD-10-CM

## 2021-04-28 DIAGNOSIS — I10 ESSENTIAL HYPERTENSION: ICD-10-CM

## 2021-04-28 PROCEDURE — 93000 ELECTROCARDIOGRAM COMPLETE: CPT | Performed by: INTERNAL MEDICINE

## 2021-04-28 PROCEDURE — 99214 OFFICE O/P EST MOD 30 MIN: CPT | Performed by: INTERNAL MEDICINE

## 2021-04-28 RX ORDER — PEPPERMINT OIL 90 MG
CAPSULE, DELAYED, AND EXTENDED RELEASE ORAL
COMMUNITY
Start: 2021-03-27 | End: 2022-05-25 | Stop reason: SDUPTHER

## 2021-04-28 NOTE — PROGRESS NOTES
[facility] Follow Up Office Note     Encounter Date:21  Patient:Carlos Alan  :1955  MRN:[id number]      Chief Complaint: Follow-up coronary artery disease    Chief Complaint   Patient presents with   • Coronary Artery Disease     1 year f/u   • Hypertension   • Hyperlipidemia   • PAF     History of Presenting Illness:      Mr. Alan is a 65 y.o. gentleman with past medical history notable for coronary artery disease status post percutaneous intervention, hypertension, mixed hyperlipidemia, and remote history of paroxysmal atrial fibrillation who presents to our office for scheduled follow-up.  Patient was seen approximately a year ago and at that time was doing well.  No changes were needed at that time.  Since then he has continued to do reasonably well from a cardiac perspective.  Unfortunately since working from home this is resulted and him gaining about 12 pounds.  He is a little bit less active and knows that he needs to be more active and eat less.  Otherwise he is doing well.  Denies any symptoms of coronary artery disease or palpitations concerning for atrial fibrillation.      Review of Systems:  Review of Systems   Constitutional: Positive for weight gain.   HENT: Negative.    Eyes: Negative.    Cardiovascular: Negative.    Respiratory: Negative.    Endocrine: Negative.    Hematologic/Lymphatic: Positive for bleeding problem.   Skin: Negative.    Musculoskeletal: Negative.    Gastrointestinal: Negative.    Genitourinary: Negative.    Neurological: Negative.    Psychiatric/Behavioral: Negative.    Allergic/Immunologic: Negative.        Current Outpatient Medications on File Prior to Visit   Medication Sig Dispense Refill   • amLODIPine (NORVASC) 5 MG tablet Take 5 mg by mouth.     • aspirin 81 MG EC tablet Take 81 mg by mouth.     • atorvastatin (LIPITOR) 80 MG tablet Take 80 mg by mouth.     • B Complex-C (B-COMPLEX WITH VITAMIN C) tablet      • ferrous sulfate 325 (65 Fe) MG  tablet      • IBgard 90 MG capsule controlled-release TAKE 2 CAPSULES BY MOUTH 30 90 MINUTES BEFORE MEALS THREE TIMES DAILY     • lisinopril (PRINIVIL,ZESTRIL) 40 MG tablet Take 40 mg by mouth.     • metoprolol succinate XL (TOPROL-XL) 50 MG 24 hr tablet Take 50 mg by mouth Daily.     • nitroglycerin (NITROSTAT) 0.4 MG SL tablet Place 0.4 mg under the tongue.     • pantoprazole (PROTONIX) 40 MG EC tablet Take 1 tablet by mouth.       No current facility-administered medications on file prior to visit.       No Known Allergies    Past Medical History:   Diagnosis Date   • Arrhythmia    • Benign hypertension    • CAD (coronary artery disease)     2/p Xience stent to mid RCA in 3/2016   • Coronary artery disease involving native coronary artery of native heart without angina pectoris    • Hyperlipidemia    • S/P drug eluting coronary stent placement        History reviewed. No pertinent surgical history.    Social History     Socioeconomic History   • Marital status: Single     Spouse name: Not on file   • Number of children: Not on file   • Years of education: Not on file   • Highest education level: Not on file   Tobacco Use   • Smoking status: Former Smoker   • Smokeless tobacco: Never Used   • Tobacco comment: caffeine use occass   Substance and Sexual Activity   • Alcohol use: Yes     Alcohol/week: 14.0 standard drinks     Types: 7 Glasses of wine, 7 Shots of liquor per week     Comment: red wine daily and cocktails on the weekends   • Drug use: No   • Sexual activity: Defer       Family History   Problem Relation Age of Onset   • Heart attack Father    • Heart attack Sister        The following portions of the patient's history were reviewed and updated as appropriate: allergies, current medications, past family history, past medical history, past social history, past surgical history and problem list.       Objective:       Vitals:    04/28/21 1457   BP: 140/84   BP Location: Left arm   Patient Position: Sitting  "  Pulse: 66   Weight: 96.6 kg (213 lb)   Height: 182.9 cm (72\")     Body mass index is 28.89 kg/m².     Physical Exam:  Constitutional: Well appearing, well developed, no acute distress   HENT: Oropharynx clear and membrane moist  Eyes: Normal conjunctiva, no sclera icterus.  Neck: Supple, no carotid bruit bilaterally.  Cardiovascular: Regular rate and rhythm, no murmur, no lower extremity edema.  Pulmonary: Normal respiratory effort, no lung sounds, no wheezing.  Abdominal: Soft, nontender, no hepatosplenomegaly, liver is non-pulsatile.  Neurological: Alert and orient x 3.   Skin: Warm, dry, no ecchymosis, no rash.  Psych: Appropriate mood and affect. Normal judgment and insight.         ECG 12 Lead    Date/Time: 4/28/2021 3:26 PM  Performed by: Young Chapman MD  Authorized by: Young Chapman MD   Comparison: compared with previous ECG from 2/6/2020  Similar to previous ECG  Rhythm: sinus rhythm    Clinical impression: normal ECG        Cardiac catheterization [facility] 3/22/2016:  · Left main angiographically normal  · LAD luminal irregularities  · Circumflex luminal irregularities  · Right coronary artery 99% stenoses with ARMEN II flow, culprit for unstable angina  · Successful percutaneous intervention with placement of 4.0 x 20 mm Promus drug-eluting stent    Echocardiogram [facility] 2/23/2018:  · Ejection fraction 60%  · Moderate mitral regurgitation  · Right ventricular systolic pressure estimated at 30 to 40%       Assessment:          Diagnosis Plan   1. Coronary artery disease involving native coronary artery of native heart without angina pectoris  ECG 12 Lead   2. Paroxysmal atrial fibrillation (CMS/HCC)     3. Mixed hyperlipidemia     4. Essential hypertension          Plan:       Mr. Alan is a 65 y.o. gentleman with past medical history notable for coronary artery disease status post percutaneous intervention, hypertension, mixed hyperlipidemia, and remote history of " paroxysmal atrial fibrillation who presents to our office for scheduled follow-up.  Overall patient is doing well.  He does note that he needs to be a little bit more active but more importantly cut back on the extra sac and help lose weight.  I think this would also optimize his blood pressure control.  We will work on getting his most recent lab work to follow-up on his lipid panel and CMP.  This is been followed by his primary care physician.  With regards to his remote history of atrial fibrillation he does have a CHADVASC score of 2 however in light of his recent GI bleed as well as the remote nature of the atrial fibrillation occurring lastly around 1993 I would hold off on initiating anticoagulation at this time.  We will continue with aspirin therapy alone for management of his coronary artery disease.    Coronary artery disease without angina:  · Continue aspirin  · Continue beta-blocker  · Continue high potency statin    Paroxysmal atrial fibrillation:  · Remote history from 1993  · No recurrent symptoms  · CHADVASC score 2 but given remote nature of atrial fibrillation and recent GI bleeding we will hold off on anticoagulation    Hypertension:  · Blood pressure is borderline today but likely will improve with weight loss and exercise    Mixed hyperlipidemia:  · Can continue high potency statin therapy  · We will obtain labs from primary care physician    Follow-up:  1 year        Young Chapman MD  [facility]  04/28/21  15:45 EDT

## 2021-10-07 ENCOUNTER — OFFICE (OUTPATIENT)
Dept: URBAN - METROPOLITAN AREA CLINIC 64 | Facility: CLINIC | Age: 66
End: 2021-10-07

## 2021-10-07 VITALS
WEIGHT: 217 LBS | HEIGHT: 72 IN | DIASTOLIC BLOOD PRESSURE: 59 MMHG | HEART RATE: 61 BPM | SYSTOLIC BLOOD PRESSURE: 116 MMHG

## 2021-10-07 DIAGNOSIS — K21.9 GASTRO-ESOPHAGEAL REFLUX DISEASE WITHOUT ESOPHAGITIS: ICD-10-CM

## 2021-10-07 PROCEDURE — 99214 OFFICE O/P EST MOD 30 MIN: CPT | Performed by: INTERNAL MEDICINE

## 2021-10-07 RX ORDER — FAMOTIDINE 40 MG/1
40 TABLET, FILM COATED ORAL
Qty: 30 | Refills: 12 | Status: ACTIVE
Start: 2021-10-07

## 2022-03-31 ENCOUNTER — HOSPITAL ENCOUNTER (OUTPATIENT)
Facility: HOSPITAL | Age: 67
Setting detail: OBSERVATION
Discharge: HOME | End: 2022-04-02
Attending: EMERGENCY MEDICINE | Admitting: INTERNAL MEDICINE

## 2022-03-31 ENCOUNTER — APPOINTMENT (OUTPATIENT)
Dept: GENERAL RADIOLOGY | Facility: HOSPITAL | Age: 67
End: 2022-03-31

## 2022-03-31 ENCOUNTER — TELEPHONE (OUTPATIENT)
Dept: CARDIOLOGY | Facility: CLINIC | Age: 67
End: 2022-03-31

## 2022-03-31 DIAGNOSIS — I48.92 ATRIAL FLUTTER WITH RAPID VENTRICULAR RESPONSE: Primary | ICD-10-CM

## 2022-03-31 DIAGNOSIS — Z95.5 HISTORY OF CORONARY ARTERY STENT PLACEMENT: ICD-10-CM

## 2022-03-31 LAB
ALBUMIN SERPL-MCNC: 4.5 G/DL (ref 3.5–5.2)
ALBUMIN/GLOB SERPL: 1.6 G/DL
ALP SERPL-CCNC: 95 U/L (ref 39–117)
ALT SERPL W P-5'-P-CCNC: 33 U/L (ref 1–41)
ANION GAP SERPL CALCULATED.3IONS-SCNC: 11.2 MMOL/L (ref 5–15)
AST SERPL-CCNC: 28 U/L (ref 1–40)
BASOPHILS # BLD AUTO: 0.03 10*3/MM3 (ref 0–0.2)
BASOPHILS NFR BLD AUTO: 0.5 % (ref 0–1.5)
BILIRUB SERPL-MCNC: 1.1 MG/DL (ref 0–1.2)
BUN SERPL-MCNC: 18 MG/DL (ref 8–23)
BUN/CREAT SERPL: 15.3 (ref 7–25)
CALCIUM SPEC-SCNC: 9.8 MG/DL (ref 8.6–10.5)
CHLORIDE SERPL-SCNC: 105 MMOL/L (ref 98–107)
CO2 SERPL-SCNC: 21.8 MMOL/L (ref 22–29)
CREAT SERPL-MCNC: 1.18 MG/DL (ref 0.76–1.27)
DEPRECATED RDW RBC AUTO: 41.5 FL (ref 37–54)
EGFRCR SERPLBLD CKD-EPI 2021: 68.1 ML/MIN/1.73
EOSINOPHIL # BLD AUTO: 0.09 10*3/MM3 (ref 0–0.4)
EOSINOPHIL NFR BLD AUTO: 1.4 % (ref 0.3–6.2)
ERYTHROCYTE [DISTWIDTH] IN BLOOD BY AUTOMATED COUNT: 12.6 % (ref 12.3–15.4)
GLOBULIN UR ELPH-MCNC: 2.9 GM/DL
GLUCOSE SERPL-MCNC: 110 MG/DL (ref 65–99)
HCT VFR BLD AUTO: 40.6 % (ref 37.5–51)
HGB BLD-MCNC: 13.7 G/DL (ref 13–17.7)
IMM GRANULOCYTES # BLD AUTO: 0.01 10*3/MM3 (ref 0–0.05)
IMM GRANULOCYTES NFR BLD AUTO: 0.2 % (ref 0–0.5)
LIPASE SERPL-CCNC: 55 U/L (ref 13–60)
LYMPHOCYTES # BLD AUTO: 0.91 10*3/MM3 (ref 0.7–3.1)
LYMPHOCYTES NFR BLD AUTO: 14.5 % (ref 19.6–45.3)
MAGNESIUM SERPL-MCNC: 1.9 MG/DL (ref 1.6–2.4)
MCH RBC QN AUTO: 30.6 PG (ref 26.6–33)
MCHC RBC AUTO-ENTMCNC: 33.7 G/DL (ref 31.5–35.7)
MCV RBC AUTO: 90.6 FL (ref 79–97)
MONOCYTES # BLD AUTO: 0.72 10*3/MM3 (ref 0.1–0.9)
MONOCYTES NFR BLD AUTO: 11.5 % (ref 5–12)
NEUTROPHILS NFR BLD AUTO: 4.5 10*3/MM3 (ref 1.7–7)
NEUTROPHILS NFR BLD AUTO: 71.9 % (ref 42.7–76)
NRBC BLD AUTO-RTO: 0 /100 WBC (ref 0–0.2)
NT-PROBNP SERPL-MCNC: 2109 PG/ML (ref 0–900)
PLATELET # BLD AUTO: 227 10*3/MM3 (ref 140–450)
PMV BLD AUTO: 10 FL (ref 6–12)
POTASSIUM SERPL-SCNC: 4.2 MMOL/L (ref 3.5–5.2)
PROT SERPL-MCNC: 7.4 G/DL (ref 6–8.5)
QT INTERVAL: 351 MS
QT INTERVAL: 355 MS
RBC # BLD AUTO: 4.48 10*6/MM3 (ref 4.14–5.8)
SARS-COV-2 RNA RESP QL NAA+PROBE: NOT DETECTED
SODIUM SERPL-SCNC: 138 MMOL/L (ref 136–145)
T4 FREE SERPL-MCNC: 1.46 NG/DL (ref 0.93–1.7)
TROPONIN T SERPL-MCNC: <0.01 NG/ML (ref 0–0.03)
TSH SERPL DL<=0.05 MIU/L-ACNC: 3.08 UIU/ML (ref 0.27–4.2)
WBC NRBC COR # BLD: 6.26 10*3/MM3 (ref 3.4–10.8)

## 2022-03-31 PROCEDURE — 71045 X-RAY EXAM CHEST 1 VIEW: CPT

## 2022-03-31 PROCEDURE — 99284 EMERGENCY DEPT VISIT MOD MDM: CPT

## 2022-03-31 PROCEDURE — G0378 HOSPITAL OBSERVATION PER HR: HCPCS

## 2022-03-31 PROCEDURE — 93005 ELECTROCARDIOGRAM TRACING: CPT

## 2022-03-31 PROCEDURE — 93005 ELECTROCARDIOGRAM TRACING: CPT | Performed by: EMERGENCY MEDICINE

## 2022-03-31 PROCEDURE — 96376 TX/PRO/DX INJ SAME DRUG ADON: CPT

## 2022-03-31 PROCEDURE — 25010000002 DIGOXIN PER 500 MCG: Performed by: INTERNAL MEDICINE

## 2022-03-31 PROCEDURE — 99283 EMERGENCY DEPT VISIT LOW MDM: CPT

## 2022-03-31 PROCEDURE — 85025 COMPLETE CBC W/AUTO DIFF WBC: CPT | Performed by: EMERGENCY MEDICINE

## 2022-03-31 PROCEDURE — U0003 INFECTIOUS AGENT DETECTION BY NUCLEIC ACID (DNA OR RNA); SEVERE ACUTE RESPIRATORY SYNDROME CORONAVIRUS 2 (SARS-COV-2) (CORONAVIRUS DISEASE [COVID-19]), AMPLIFIED PROBE TECHNIQUE, MAKING USE OF HIGH THROUGHPUT TECHNOLOGIES AS DESCRIBED BY CMS-2020-01-R: HCPCS | Performed by: EMERGENCY MEDICINE

## 2022-03-31 PROCEDURE — 84439 ASSAY OF FREE THYROXINE: CPT | Performed by: EMERGENCY MEDICINE

## 2022-03-31 PROCEDURE — 84443 ASSAY THYROID STIM HORMONE: CPT | Performed by: EMERGENCY MEDICINE

## 2022-03-31 PROCEDURE — 80053 COMPREHEN METABOLIC PANEL: CPT | Performed by: EMERGENCY MEDICINE

## 2022-03-31 PROCEDURE — 83690 ASSAY OF LIPASE: CPT | Performed by: EMERGENCY MEDICINE

## 2022-03-31 PROCEDURE — C9803 HOPD COVID-19 SPEC COLLECT: HCPCS

## 2022-03-31 PROCEDURE — 83735 ASSAY OF MAGNESIUM: CPT | Performed by: EMERGENCY MEDICINE

## 2022-03-31 PROCEDURE — 83880 ASSAY OF NATRIURETIC PEPTIDE: CPT | Performed by: EMERGENCY MEDICINE

## 2022-03-31 PROCEDURE — 99214 OFFICE O/P EST MOD 30 MIN: CPT | Performed by: INTERNAL MEDICINE

## 2022-03-31 PROCEDURE — 93010 ELECTROCARDIOGRAM REPORT: CPT | Performed by: INTERNAL MEDICINE

## 2022-03-31 PROCEDURE — 96374 THER/PROPH/DIAG INJ IV PUSH: CPT

## 2022-03-31 PROCEDURE — 84484 ASSAY OF TROPONIN QUANT: CPT | Performed by: EMERGENCY MEDICINE

## 2022-03-31 RX ORDER — SODIUM CHLORIDE 0.9 % (FLUSH) 0.9 %
10 SYRINGE (ML) INJECTION EVERY 12 HOURS SCHEDULED
Status: DISCONTINUED | OUTPATIENT
Start: 2022-03-31 | End: 2022-04-02 | Stop reason: HOSPADM

## 2022-03-31 RX ORDER — DIGOXIN 0.25 MG/ML
250 INJECTION INTRAMUSCULAR; INTRAVENOUS EVERY 8 HOURS
Status: COMPLETED | OUTPATIENT
Start: 2022-03-31 | End: 2022-04-01

## 2022-03-31 RX ORDER — PANTOPRAZOLE SODIUM 40 MG/1
40 TABLET, DELAYED RELEASE ORAL
Status: DISCONTINUED | OUTPATIENT
Start: 2022-04-01 | End: 2022-04-02 | Stop reason: HOSPADM

## 2022-03-31 RX ORDER — SODIUM CHLORIDE 0.9 % (FLUSH) 0.9 %
10 SYRINGE (ML) INJECTION AS NEEDED
Status: DISCONTINUED | OUTPATIENT
Start: 2022-03-31 | End: 2022-04-02 | Stop reason: HOSPADM

## 2022-03-31 RX ORDER — ASPIRIN 81 MG/1
81 TABLET ORAL DAILY
Status: DISCONTINUED | OUTPATIENT
Start: 2022-03-31 | End: 2022-04-02 | Stop reason: HOSPADM

## 2022-03-31 RX ORDER — DIGOXIN 0.25 MG/ML
500 INJECTION INTRAMUSCULAR; INTRAVENOUS ONCE
Status: COMPLETED | OUTPATIENT
Start: 2022-03-31 | End: 2022-03-31

## 2022-03-31 RX ORDER — AMLODIPINE BESYLATE 5 MG/1
5 TABLET ORAL
Status: DISCONTINUED | OUTPATIENT
Start: 2022-03-31 | End: 2022-04-01

## 2022-03-31 RX ORDER — ATORVASTATIN CALCIUM 80 MG/1
80 TABLET, FILM COATED ORAL DAILY
Status: DISCONTINUED | OUTPATIENT
Start: 2022-03-31 | End: 2022-04-02 | Stop reason: HOSPADM

## 2022-03-31 RX ORDER — LISINOPRIL 20 MG/1
40 TABLET ORAL
Status: DISCONTINUED | OUTPATIENT
Start: 2022-03-31 | End: 2022-04-01

## 2022-03-31 RX ORDER — METOPROLOL SUCCINATE 50 MG/1
50 TABLET, EXTENDED RELEASE ORAL DAILY
Status: DISCONTINUED | OUTPATIENT
Start: 2022-03-31 | End: 2022-04-01

## 2022-03-31 RX ORDER — CALCIUM POLYCARBOPHIL 625 MG
2 TABLET ORAL 3 TIMES DAILY
COMMUNITY

## 2022-03-31 RX ADMIN — ATORVASTATIN CALCIUM 80 MG: 80 TABLET, FILM COATED ORAL at 14:45

## 2022-03-31 RX ADMIN — APIXABAN 5 MG: 5 TABLET, FILM COATED ORAL at 20:20

## 2022-03-31 RX ADMIN — Medication 10 ML: at 12:50

## 2022-03-31 RX ADMIN — DIGOXIN 250 MCG: 0.25 INJECTION INTRAMUSCULAR; INTRAVENOUS at 19:42

## 2022-03-31 RX ADMIN — Medication 10 ML: at 20:20

## 2022-03-31 RX ADMIN — DIGOXIN 500 MCG: 0.25 INJECTION INTRAMUSCULAR; INTRAVENOUS at 12:00

## 2022-03-31 RX ADMIN — APIXABAN 5 MG: 5 TABLET, FILM COATED ORAL at 14:45

## 2022-03-31 NOTE — TELEPHONE ENCOUNTER
Patient states he has felt a flutter in his chest on and off. This morning the patient changed the batteries in his KardiaMobile device and it showed a HR of 162 bpm. The patient called his primary care provider but was unable to get an appointment so he did a virtual health appointment. The virtual health provider recommended either ER or an appointment with his cardiologist in the next 1-2 hours for evaluation.     Patient reports his HR has been ranging in the 160s since 6:00 am. I recommended he go to the ER for evaluation due to his HR being greater than 130 bpm for over 3 hours. The patient verbalized understanding and agreed with this plan.     Chantel Dennis, FRANCESCAN, RN  [facility] Triage

## 2022-04-01 ENCOUNTER — APPOINTMENT (OUTPATIENT)
Dept: CARDIOLOGY | Facility: HOSPITAL | Age: 67
End: 2022-04-01

## 2022-04-01 PROBLEM — I48.92 ATRIAL FLUTTER WITH RAPID VENTRICULAR RESPONSE (HCC): Status: ACTIVE | Noted: 2022-04-01

## 2022-04-01 LAB
ANION GAP SERPL CALCULATED.3IONS-SCNC: 12 MMOL/L (ref 5–15)
BH CV ECHO MEAS - ACS: 2.19 CM
BH CV ECHO MEAS - AO MAX PG: 5 MMHG
BH CV ECHO MEAS - AO MEAN PG: 2.6 MMHG
BH CV ECHO MEAS - AO ROOT DIAM: 3 CM
BH CV ECHO MEAS - AO V2 MAX: 111.9 CM/SEC
BH CV ECHO MEAS - AO V2 VTI: 14.9 CM
BH CV ECHO MEAS - AVA(I,D): 3.5 CM2
BH CV ECHO MEAS - EDV(CUBED): 74.4 ML
BH CV ECHO MEAS - EDV(MOD-SP2): 22 ML
BH CV ECHO MEAS - EDV(MOD-SP4): 42 ML
BH CV ECHO MEAS - EF(MOD-BP): 62.6 %
BH CV ECHO MEAS - EF(MOD-SP2): 59.1 %
BH CV ECHO MEAS - EF(MOD-SP4): 61.9 %
BH CV ECHO MEAS - ESV(CUBED): 24.4 ML
BH CV ECHO MEAS - ESV(MOD-SP2): 9 ML
BH CV ECHO MEAS - ESV(MOD-SP4): 16 ML
BH CV ECHO MEAS - FS: 31 %
BH CV ECHO MEAS - IVS/LVPW: 1.2 CM
BH CV ECHO MEAS - IVSD: 1.2 CM
BH CV ECHO MEAS - LA DIMENSION: 3.9 CM
BH CV ECHO MEAS - LV DIASTOLIC VOL/BSA (35-75): 19.5 CM2
BH CV ECHO MEAS - LV MASS(C)D: 157.3 GRAMS
BH CV ECHO MEAS - LV MAX PG: 3.1 MMHG
BH CV ECHO MEAS - LV MEAN PG: 1.55 MMHG
BH CV ECHO MEAS - LV SYSTOLIC VOL/BSA (12-30): 7.4 CM2
BH CV ECHO MEAS - LV V1 MAX: 87.8 CM/SEC
BH CV ECHO MEAS - LV V1 VTI: 13.7 CM
BH CV ECHO MEAS - LVIDD: 4.2 CM
BH CV ECHO MEAS - LVIDS: 2.9 CM
BH CV ECHO MEAS - LVOT AREA: 3.8 CM2
BH CV ECHO MEAS - LVOT DIAM: 2.19 CM
BH CV ECHO MEAS - LVPWD: 1 CM
BH CV ECHO MEAS - MV DEC SLOPE: 484.3 CM/SEC2
BH CV ECHO MEAS - MV DEC TIME: 482 MSEC
BH CV ECHO MEAS - MV E MAX VEL: 84.4 CM/SEC
BH CV ECHO MEAS - MV MAX PG: 6.3 MMHG
BH CV ECHO MEAS - MV MEAN PG: 2.7 MMHG
BH CV ECHO MEAS - MV V2 VTI: 22.1 CM
BH CV ECHO MEAS - MVA(VTI): 2.33 CM2
BH CV ECHO MEAS - PA ACC TIME: 0.13 SEC
BH CV ECHO MEAS - PA PR(ACCEL): 20.8 MMHG
BH CV ECHO MEAS - PA V2 MAX: 142.5 CM/SEC
BH CV ECHO MEAS - RAP SYSTOLE: 3 MMHG
BH CV ECHO MEAS - RV MAX PG: 2.1 MMHG
BH CV ECHO MEAS - RV V1 MAX: 72.4 CM/SEC
BH CV ECHO MEAS - RV V1 VTI: 12.3 CM
BH CV ECHO MEAS - RVOT DIAM: 2.37 CM
BH CV ECHO MEAS - RVSP: 39.2 MMHG
BH CV ECHO MEAS - SI(MOD-SP2): 6 ML/M2
BH CV ECHO MEAS - SI(MOD-SP4): 12.1 ML/M2
BH CV ECHO MEAS - SV(LVOT): 51.5 ML
BH CV ECHO MEAS - SV(MOD-SP2): 13 ML
BH CV ECHO MEAS - SV(MOD-SP4): 26 ML
BH CV ECHO MEAS - SV(RVOT): 54.3 ML
BH CV ECHO MEAS - TAPSE (>1.6): 1.78 CM
BH CV ECHO MEAS - TR MAX PG: 36.2 MMHG
BH CV ECHO MEAS - TR MAX VEL: 300.7 CM/SEC
BH CV XLRA - RV BASE: 2.01 CM
BH CV XLRA - RV LENGTH: 7 CM
BH CV XLRA - RV MID: 1.75 CM
BUN SERPL-MCNC: 14 MG/DL (ref 8–23)
BUN/CREAT SERPL: 14.4 (ref 7–25)
CALCIUM SPEC-SCNC: 8.6 MG/DL (ref 8.6–10.5)
CHLORIDE SERPL-SCNC: 110 MMOL/L (ref 98–107)
CO2 SERPL-SCNC: 21 MMOL/L (ref 22–29)
CREAT SERPL-MCNC: 0.97 MG/DL (ref 0.76–1.27)
DEPRECATED RDW RBC AUTO: 40.7 FL (ref 37–54)
EGFRCR SERPLBLD CKD-EPI 2021: 86.1 ML/MIN/1.73
ERYTHROCYTE [DISTWIDTH] IN BLOOD BY AUTOMATED COUNT: 12.5 % (ref 12.3–15.4)
GLUCOSE SERPL-MCNC: 88 MG/DL (ref 65–99)
HCT VFR BLD AUTO: 36.6 % (ref 37.5–51)
HGB BLD-MCNC: 12.6 G/DL (ref 13–17.7)
LEFT ATRIUM VOLUME INDEX: 19.5 ML/M2
MAXIMAL PREDICTED HEART RATE: 154 BPM
MCH RBC QN AUTO: 31 PG (ref 26.6–33)
MCHC RBC AUTO-ENTMCNC: 34.4 G/DL (ref 31.5–35.7)
MCV RBC AUTO: 89.9 FL (ref 79–97)
PLATELET # BLD AUTO: 208 10*3/MM3 (ref 140–450)
PMV BLD AUTO: 10.6 FL (ref 6–12)
POTASSIUM SERPL-SCNC: 4 MMOL/L (ref 3.5–5.2)
QT INTERVAL: 312 MS
RBC # BLD AUTO: 4.07 10*6/MM3 (ref 4.14–5.8)
SINUS: 2.5 CM
SODIUM SERPL-SCNC: 143 MMOL/L (ref 136–145)
STJ: 2.5 CM
STRESS TARGET HR: 131 BPM
WBC NRBC COR # BLD: 5.31 10*3/MM3 (ref 3.4–10.8)

## 2022-04-01 PROCEDURE — 96376 TX/PRO/DX INJ SAME DRUG ADON: CPT

## 2022-04-01 PROCEDURE — G0378 HOSPITAL OBSERVATION PER HR: HCPCS

## 2022-04-01 PROCEDURE — 93005 ELECTROCARDIOGRAM TRACING: CPT | Performed by: INTERNAL MEDICINE

## 2022-04-01 PROCEDURE — 80048 BASIC METABOLIC PNL TOTAL CA: CPT | Performed by: STUDENT IN AN ORGANIZED HEALTH CARE EDUCATION/TRAINING PROGRAM

## 2022-04-01 PROCEDURE — 85027 COMPLETE CBC AUTOMATED: CPT | Performed by: STUDENT IN AN ORGANIZED HEALTH CARE EDUCATION/TRAINING PROGRAM

## 2022-04-01 PROCEDURE — 93010 ELECTROCARDIOGRAM REPORT: CPT | Performed by: INTERNAL MEDICINE

## 2022-04-01 PROCEDURE — 99214 OFFICE O/P EST MOD 30 MIN: CPT | Performed by: INTERNAL MEDICINE

## 2022-04-01 PROCEDURE — 93306 TTE W/DOPPLER COMPLETE: CPT

## 2022-04-01 PROCEDURE — 93306 TTE W/DOPPLER COMPLETE: CPT | Performed by: INTERNAL MEDICINE

## 2022-04-01 PROCEDURE — 25010000002 DIGOXIN PER 500 MCG: Performed by: INTERNAL MEDICINE

## 2022-04-01 RX ORDER — DIGOXIN 125 MCG
125 TABLET ORAL
Status: DISCONTINUED | OUTPATIENT
Start: 2022-04-01 | End: 2022-04-02 | Stop reason: HOSPADM

## 2022-04-01 RX ORDER — METOPROLOL SUCCINATE 50 MG/1
100 TABLET, EXTENDED RELEASE ORAL DAILY
Status: DISCONTINUED | OUTPATIENT
Start: 2022-04-02 | End: 2022-04-01

## 2022-04-01 RX ORDER — METOPROLOL SUCCINATE 100 MG/1
100 TABLET, EXTENDED RELEASE ORAL DAILY
Status: DISCONTINUED | OUTPATIENT
Start: 2022-04-01 | End: 2022-04-02 | Stop reason: HOSPADM

## 2022-04-01 RX ADMIN — ATORVASTATIN CALCIUM 80 MG: 80 TABLET, FILM COATED ORAL at 09:50

## 2022-04-01 RX ADMIN — Medication 10 ML: at 20:04

## 2022-04-01 RX ADMIN — DIGOXIN 125 MCG: 125 TABLET ORAL at 12:18

## 2022-04-01 RX ADMIN — APIXABAN 5 MG: 5 TABLET, FILM COATED ORAL at 09:51

## 2022-04-01 RX ADMIN — APIXABAN 5 MG: 5 TABLET, FILM COATED ORAL at 20:03

## 2022-04-01 RX ADMIN — METOPROLOL SUCCINATE 100 MG: 100 TABLET, EXTENDED RELEASE ORAL at 09:50

## 2022-04-01 RX ADMIN — DIGOXIN 250 MCG: 0.25 INJECTION INTRAMUSCULAR; INTRAVENOUS at 03:53

## 2022-04-01 RX ADMIN — ASPIRIN 81 MG: 81 TABLET, COATED ORAL at 09:50

## 2022-04-01 RX ADMIN — Medication 10 ML: at 07:54

## 2022-04-01 NOTE — PROGRESS NOTES
ED OBSERVATION PROGRESS/DISCHARGE SUMMARY    Date of Admission: 3/31/2022   LOS: 0 days   PCP: Usama Swann MD    Final Diagnosis a fib    Subjective    Patient reports he is still having palpitations with sensation of heart fluttering.  He denies any chest pain or pressure.  Denies any shortness of breath.  Denies any fevers or chills.    Hospital Outcome:   66-year-old male with a history of CAD status post stent x1, proximal atrial fibrillation, hypertension and hyperlipidemia who presents to [facility] ER palpitations for 1 week.  ER evaluation significant for patient arriving with EKG revealing A. fib RVR, rate 156.  Chest x-ray negative.  Cardiology was consulted, on evaluated patient, recommended loading patient with digoxin and started on Eliquis for anticoagulation.    ROS:  General: no fevers, chills  Respiratory: no cough, dyspnea  Cardiovascular: no chest pain, palpitations  Abdomen: No abdominal pain, nausea, vomiting, or diarrhea  Neurologic: No focal weakness    Objective   Physical Exam:  I have reviewed the vital signs.  Temp:  [97.3 °F (36.3 °C)-98.4 °F (36.9 °C)] 98.4 °F (36.9 °C)  Heart Rate:  [114-158] 117  Resp:  [16-18] 16  BP: ()/(66-84) 109/71  General Appearance:  66-year-old male, well-nourished, no acute distress on room air  Head:    Normocephalic, atraumatic  Eyes:    Sclerae anicteric  Neck:   Supple, no mass  Lungs: Clear to auscultation bilaterally, respirations unlabored  Heart: Tachycardia, irregularly irregular rhythm, S1 and S2 normal, no murmur, rub or gallop  Abdomen:  Soft, non-tender, bowel sounds active, nondistended  Extremities: No clubbing, cyanosis, or edema to lower extremities  Pulses:  2+ and symmetric in distal lower extremities  Skin: No rashes   Neurologic: Oriented x3, Normal strength to extremities    Results Review:    I have reviewed the labs, radiology results and diagnostic studies.    Results from last 7 days   Lab Units  03/31/22  1018   WBC 10*3/mm3 6.26   HEMOGLOBIN g/dL 13.7   HEMATOCRIT % 40.6   PLATELETS 10*3/mm3 227     Results from last 7 days   Lab Units 03/31/22  1018   SODIUM mmol/L 138   POTASSIUM mmol/L 4.2   CHLORIDE mmol/L 105   CO2 mmol/L 21.8*   BUN mg/dL 18   CREATININE mg/dL 1.18   CALCIUM mg/dL 9.8   BILIRUBIN mg/dL 1.1   ALK PHOS U/L 95   ALT (SGPT) U/L 33   AST (SGOT) U/L 28   GLUCOSE mg/dL 110*     Imaging Results (Last 24 Hours)     Procedure Component Value Units Date/Time    XR Chest 1 View [[id number]] Collected: 03/31/22 1138     Updated: 03/31/22 1408    Narrative:      PORTABLE CHEST     HISTORY: Shortness of air.     FINDINGS: A single view of the chest demonstrate the heart to be within  normal limits in size. There is no evidence of infiltrate, effusion or  congestive failure. A small to moderate size hiatal hernia is noted.     This report was finalized on 3/31/2022 2:05 PM by Dr. Brayan Arrington M.D.             I have reviewed the medications.  ---------------------------------------------------------------------------------------------  Assessment/Plan   Assessment/Problem List    Palpitations      Plan:    MATTHEW wiggins RVBARRETT  -Dr. Chapman with cardiology ordered digoxin load as well as PL Eliquis for anticoagulation  -Patient still remains in atrial fibrillation  -N.p.o.  -echo pending  -cardiology wants patient admitted for further rate control    Hypertension  -Home antihypertensives scheduled for this afternoon  -Continue to monitor with vital signs    Disposition: admission to hospital    Follow-up after Discharge: PCP, cardiology    This note will serve as transfer summary.     Alisson Bonds, SIMON 04/01/22 09:43 EDT

## 2022-04-01 NOTE — PROGRESS NOTES
[facility] Follow Up    Chief Complaint: Follow up atrial flutter-fibrillation    Interval History: He continues to have intermittent palpitations.  Otherwise denies any shortness of breath or chest pain.  It appears that he is now in atrial fibrillation with heart rates in the 100s to 120s with occasional spikes in the 130s to 140s that are brief and nonsustained.  Blood pressures are relatively soft.    Objective:     Objective:  Temp:  [97.3 °F (36.3 °C)-98.9 °F (37.2 °C)] 98.9 °F (37.2 °C)  Heart Rate:  [110-158] 110  Resp:  [16-18] 16  BP: ()/(60-84) 105/60   No intake or output data in the 24 hours ending 04/01/22 0818  Body mass index is 27.8 kg/m².      03/31/22  1018 03/31/22  1152 04/01/22  0502   Weight: 92.8 kg (204 lb 9.6 oz) 92.5 kg (204 lb) 93 kg (205 lb)     Weight change:       Physical Exam:   General : Alert, cooperative, in no acute distress.  Neuro: Alert,cooperative and oriented.  Lungs: CTAB. Normal respiratory effort and rate.  CV: Irregularly, irregular, normal S1 and S2, no murmurs, gallops or rubs.  ABD: Soft, nontender, nondistended. Positive bowel sounds.  Extr: No edema or cyanosis, moves all extremities.    Lab Review:   Results from last 7 days   Lab Units 04/01/22  0352 03/31/22  1018   SODIUM mmol/L 143 138   POTASSIUM mmol/L 4.0 4.2   CHLORIDE mmol/L 110* 105   CO2 mmol/L 21.0* 21.8*   BUN mg/dL 14 18   CREATININE mg/dL 0.97 1.18   GLUCOSE mg/dL 88 110*   CALCIUM mg/dL 8.6 9.8   AST (SGOT) U/L  --  28   ALT (SGPT) U/L  --  33     Results from last 7 days   Lab Units 03/31/22  1018   TROPONIN T ng/mL <0.010     Results from last 7 days   Lab Units 04/01/22  0352 03/31/22  1018   WBC 10*3/mm3 5.31 6.26   HEMOGLOBIN g/dL 12.6* 13.7   HEMATOCRIT % 36.6* 40.6   PLATELETS 10*3/mm3 208 227         Results from last 7 days   Lab Units 03/31/22  1018   MAGNESIUM mg/dL 1.9           Invalid input(s): LDLCALC  Results from last 7 days   Lab Units 03/31/22  1018    PROBNP pg/mL 2,109.0*     Results from last 7 days   Lab Units 03/31/22  1018   TSH uIU/mL 3.080     I reviewed the patient's new clinical results.  I personally viewed and interpreted the patient's EKG  Current Medications:   Scheduled Meds:amLODIPine, 5 mg, Oral, Q24H  apixaban, 5 mg, Oral, Q12H  aspirin, 81 mg, Oral, Daily  atorvastatin, 80 mg, Oral, Daily  lisinopril, 40 mg, Oral, Q24H  metoprolol succinate XL, 50 mg, Oral, Daily  pantoprazole, 40 mg, Oral, Q AM  sodium chloride, 10 mL, Intravenous, Q12H      Continuous Infusions:     Allergies:  No Known Allergies    Assessment/Plan:     1. Atrial flutter/fibrillation.  He is now in atrial fibrillation with better rates.  On metoprolol succinate 50 mg.  Received digoxin loading yesterday.  On apixaban for anticoagulation which was started yesterday.  2. Hypertension  3. Coronary artery disease. Prior stent to RCA.  4. Hyperlipidemia  5. Remote history of GI bleed.    -Discussed options with the patient.  I think at this point since he is in atrial fibrillation which is usually easier to rate control that we could start with the option of adjusting his medications to achieve better rate control and continue anticoagulation than proceeding directly with NORY/cardioversion.  So we can reserve the option of cardioversion if he continues to have symptoms with atrial fibrillation despite rate control or if we continue to struggle with controlling his rate.  The patient is agreeable with this plan.  -Increase metoprolol succinate dosage to 100 mg daily.  -Continue amlodipine.  Hold lisinopril dosage this morning and likely plan to resume at a lower dose depending on blood pressures after the increased dosage of metoprolol.  -Add low-dose digoxin.  -Continue apixaban.  -Check transthoracic echocardiogram.  -Once we obtain decent rate control (with heart rates remaining less than 120 bpm on average) and if his echocardiogram is unremarkable I think he can be discharged  if he is not having any further significant symptoms.    Brittani Bentley MD  04/01/22  08:18 EDT

## 2022-04-01 NOTE — NURSING NOTE
SW Cardiology Nurse Practitioner, Alejandra, about pt status; she stated that she would have the Doctor look at the echo results and decide if the pt needed to be admitted or not; she presumes that pt might be admitted due to uncontrolled tachycardia with persistent atrial flutter and afib; Alejandra assured me that someone would let Mr. Alan know the results of his echo whether he was being discharged or admitted to floor.

## 2022-04-01 NOTE — PROGRESS NOTES
Pt admitted from the emergency department yesterday with diagnosis of atrial flutter with RVR, not previously on anticoagulation prior to ER presentation.  Was loaded with digoxin, Eliquis, with plans for observation unit monitoring and potential cardioversion today if no resolution of the rhythm.  Patient reports feeling well overall with occasional pounding heartbeat.  No dizziness or chest pain, no shortness of breath, heart rate has slowed overnight.     On exam,   General: No acute distress, nontoxic  HEENT: Mucous membranes moist, atraumatic, EOMI  Neck: Full ROM  Pulm: Symmetric chest rise, nonlabored, lungs CTAB  Cardiovascular: Borderline mild irregularly irregular tachycardia, intact distal pulses, no peripheral edema  GI: Soft, nontender, nondistended, no rebound, no guarding, bowel sounds present  MSK: Full ROM, no deformity  Skin: Warm, dry  Neuro: Awake, alert, oriented x 4, GCS 15, moving all extremities, no focal deficits  Psych: Calm, cooperative      N95, protective eye goggles, and gloves used during this encounter. Patient in surgical mask.    EKG    EKG Time: 0831  Rhythm/Rate: Atrial fibrillation with a rate of 129  Normal axis  Otherwise normal intervals  Nonspecific T wave abnormalities with inversions in the inferior leads and some borderline mild depressions in the anterior precordial leads which may be rate related   No STEMI     Interpreted Contemporaneously by me, independently viewed  This is a new rhythm change from March 31, 2021 2 which showed atrial flutter at that time      Plan: Cardiology has seen, plan for a echo today, if can achieve rate control can potentially discharge home without the need for cardioversion today.  Will reevaluate based on rate and rhythm and echo results today.       Attestation:  The HOLDEN and I have discussed this patient's history, physical exam, and treatment plan.  I have reviewed the documentation and personally had a face to face interaction with the  patient. I affirm the documentation and agree with the treatment and plan.  The attached note describes my personal findings.

## 2022-04-02 VITALS
WEIGHT: 205 LBS | SYSTOLIC BLOOD PRESSURE: 111 MMHG | BODY MASS INDEX: 27.77 KG/M2 | DIASTOLIC BLOOD PRESSURE: 77 MMHG | HEART RATE: 86 BPM | OXYGEN SATURATION: 91 % | HEIGHT: 72 IN | RESPIRATION RATE: 18 BRPM | TEMPERATURE: 98.1 F

## 2022-04-02 PROBLEM — I48.91 ATRIAL FIBRILLATION WITH RVR: Status: ACTIVE | Noted: 2022-04-02

## 2022-04-02 PROCEDURE — 99217 PR OBSERVATION CARE DISCHARGE MANAGEMENT: CPT | Performed by: INTERNAL MEDICINE

## 2022-04-02 PROCEDURE — G0378 HOSPITAL OBSERVATION PER HR: HCPCS

## 2022-04-02 RX ORDER — DIGOXIN 125 MCG
125 TABLET ORAL
Qty: 30 TABLET | Refills: 1 | Status: SHIPPED | OUTPATIENT
Start: 2022-04-02 | End: 2022-05-25 | Stop reason: SDUPTHER

## 2022-04-02 RX ORDER — METOPROLOL SUCCINATE 100 MG/1
100 TABLET, EXTENDED RELEASE ORAL DAILY
Qty: 90 TABLET | Refills: 1 | Status: SHIPPED | OUTPATIENT
Start: 2022-04-03

## 2022-04-02 RX ADMIN — Medication 10 ML: at 08:21

## 2022-04-02 RX ADMIN — METOPROLOL SUCCINATE 100 MG: 100 TABLET, EXTENDED RELEASE ORAL at 08:21

## 2022-04-02 RX ADMIN — ASPIRIN 81 MG: 81 TABLET, COATED ORAL at 08:21

## 2022-04-02 RX ADMIN — APIXABAN 5 MG: 5 TABLET, FILM COATED ORAL at 08:21

## 2022-04-02 RX ADMIN — PANTOPRAZOLE SODIUM 40 MG: 40 TABLET, DELAYED RELEASE ORAL at 06:25

## 2022-04-02 RX ADMIN — DIGOXIN 125 MCG: 125 TABLET ORAL at 12:33

## 2022-04-02 NOTE — PLAN OF CARE
Goal Outcome Evaluation:   Achieved.               
Goal Outcome Evaluation:  Plan of Care Reviewed With: patient        Progress: no change  Outcome Evaluation: A/O, no complaints of pain, up ad rafy, HR 110s-130s, afib on the monitor, NPO since MN, cards to see today, VSS, will continue to monitor.  
Goal Outcome Evaluation:  Plan of Care Reviewed With: patient   Pt A&O x4; pt in and out of atrial flutter and fib; pt HR tachy 110-130 most of the day; pt denies pain or symptoms; pt adlib; pt being admitted to floor 2203 from OBS for further workup of rhythm and rate; room being cleaned then will call report when room ready.      Progress: improving     
Seizure

## 2022-04-02 NOTE — DISCHARGE SUMMARY
HOSPITAL DISCHARGE SUMMARY    Patient Name: Carlos Alan  Age/Sex: 66 y.o. male  : 1955  MRN: [id number]    Encounter Provider: Vlad Skaggs MD  Referring Provider: Franky Hartman MD  Place of Service: [facility] CARDIOLOG[facility]  Patient Care Team:  Usama Swann MD as PCP - General (Internal Medicine)         Date of Discharge:  2022     Date of Admit: 3/31/2022      Discharge Diagnosis:     Atrial flutter with rapid ventricular response (HCC)    Atrial fibrillation with RVR (HCC)      Hospital Course: The patient was admitted to the hospital with atrial flutter.  He was started on apixaban and digoxin.  Metoprolol was increased.  He converted to atrial fibrillation with an improved rate.  His echocardiogram was unremarkable.  He is going to be discharged home today to follow-up as an outpatient.  He did not complain of angina pectoris.  His blood pressure was under good control.    Procedures Performed:               Pertinent Test Results:    Results from last 7 days   Lab Units 22  0352   SODIUM mmol/L 143   POTASSIUM mmol/L 4.0   CHLORIDE mmol/L 110*   CO2 mmol/L 21.0*   BUN mg/dL 14   CREATININE mg/dL 0.97   GLUCOSE mg/dL 88   CALCIUM mg/dL 8.6     Results from last 7 days   Lab Units 22  1018   TROPONIN T ng/mL <0.010     Results from last 7 days   Lab Units 22  0352   WBC 10*3/mm3 5.31   HEMOGLOBIN g/dL 12.6*   HEMATOCRIT % 36.6*   PLATELETS 10*3/mm3 208         Results from last 7 days   Lab Units 22  1018   MAGNESIUM mg/dL 1.9         Results from last 7 days   Lab Units 22  1018   PROBNP pg/mL 2,109.0*     Results from last 7 days   Lab Units 22  1018   TSH uIU/mL 3.080           Discharge Medications     Discharge Medications      New Medications      Instructions Start Date   apixaban 5 MG tablet tablet  Commonly known as: ELIQUIS   5 mg, Oral,  Every 12 Hours Scheduled      digoxin 125 MCG tablet  Commonly known as: LANOXIN   125 mcg, Oral, Daily Digoxin         Changes to Medications      Instructions Start Date   metoprolol succinate  MG 24 hr tablet  Commonly known as: TOPROL-XL  What changed:   · medication strength  · how much to take   100 mg, Oral, Daily   Start Date: April 3, 2022        Continue These Medications      Instructions Start Date   aspirin 81 MG EC tablet   81 mg, Oral      atorvastatin 80 MG tablet  Commonly known as: LIPITOR   80 mg, Oral      B-complex with vitamin C tablet   No dose, route, or frequency recorded.      ferrous sulfate 325 (65 Fe) MG tablet   No dose, route, or frequency recorded.      IBgard 90 MG capsule controlled-release  Generic drug: Peppermint Oil       nitroglycerin 0.4 MG SL tablet  Commonly known as: NITROSTAT   0.4 mg, Sublingual      pantoprazole 40 MG EC tablet  Commonly known as: PROTONIX   1 tablet, Oral      polycarbophil 625 MG tablet tablet   2 capsules, Oral, 3 Times Daily         Stop These Medications    amLODIPine 5 MG tablet  Commonly known as: NORVASC     lisinopril 40 MG tablet  Commonly known as: PRINIVIL,ZESTRIL                  Discharge disposition:       Follow-up Appointments  Future Appointments   Date Time Provider Department Center   5/4/2022  3:15 PM Young Chapman MD [facility]      Follow-up Information     Usama Swann MD .    Specialty: Internal Medicine  Contact information:  [address] [place] [address] [place] [address] [phone]                       SIMON Gilmore in 1 week  Dr. Chapman in 1 month       Vlad Skaggs MD  04/02/22  11:55 EDT

## 2022-04-04 NOTE — CASE MANAGEMENT/SOCIAL WORK
Case Management Discharge Note      Final Note: Home         Selected Continued Care - Discharged on 4/2/2022 Admission date: 3/31/2022 - Discharge disposition: Home or Self Care    Destination    No services have been selected for the patient.              Durable Medical Equipment    No services have been selected for the patient.              Dialysis/Infusion    No services have been selected for the patient.              Home Medical Care    No services have been selected for the patient.              Therapy    No services have been selected for the patient.              Community Resources    No services have been selected for the patient.              Community & DME    No services have been selected for the patient.                  Transportation Services  Private: Car    Final Discharge Disposition Code: 01 - home or self-care

## 2022-04-05 ENCOUNTER — TELEPHONE (OUTPATIENT)
Dept: CARDIOLOGY | Facility: CLINIC | Age: 67
End: 2022-04-05

## 2022-04-06 NOTE — TELEPHONE ENCOUNTER
He is cleared to have root canal.  If dentist is requiring him to hold Eliquis, we would recommend for 48 hours.

## 2022-04-11 NOTE — TELEPHONE ENCOUNTER
Spoke with pt regarding eliquis clearance for root canal (after returning from 4 days off work), pt stated he had his root canal done and did not stop eliquis.    dd

## 2022-04-13 ENCOUNTER — OFFICE VISIT (OUTPATIENT)
Dept: CARDIOLOGY | Facility: CLINIC | Age: 67
End: 2022-04-13

## 2022-04-13 VITALS
WEIGHT: 208 LBS | HEART RATE: 52 BPM | DIASTOLIC BLOOD PRESSURE: 70 MMHG | SYSTOLIC BLOOD PRESSURE: 142 MMHG | HEIGHT: 72 IN | BODY MASS INDEX: 28.17 KG/M2

## 2022-04-13 DIAGNOSIS — Z95.5 S/P DRUG ELUTING CORONARY STENT PLACEMENT: ICD-10-CM

## 2022-04-13 DIAGNOSIS — I25.10 CORONARY ARTERY DISEASE INVOLVING NATIVE CORONARY ARTERY OF NATIVE HEART WITHOUT ANGINA PECTORIS: ICD-10-CM

## 2022-04-13 DIAGNOSIS — I48.92 ATRIAL FLUTTER WITH RAPID VENTRICULAR RESPONSE: ICD-10-CM

## 2022-04-13 DIAGNOSIS — I48.0 PAROXYSMAL ATRIAL FIBRILLATION: Primary | ICD-10-CM

## 2022-04-13 DIAGNOSIS — E78.2 MIXED HYPERLIPIDEMIA: ICD-10-CM

## 2022-04-13 DIAGNOSIS — I10 ESSENTIAL HYPERTENSION: ICD-10-CM

## 2022-04-13 PROCEDURE — 99214 OFFICE O/P EST MOD 30 MIN: CPT | Performed by: NURSE PRACTITIONER

## 2022-04-13 PROCEDURE — 93000 ELECTROCARDIOGRAM COMPLETE: CPT | Performed by: NURSE PRACTITIONER

## 2022-04-13 RX ORDER — FAMOTIDINE 20 MG/1
40 TABLET, FILM COATED ORAL AS NEEDED
COMMUNITY

## 2022-04-13 RX ORDER — PREDNISONE 20 MG/1
20 TABLET ORAL AS NEEDED
COMMUNITY

## 2022-04-13 RX ORDER — DOXYCYCLINE HYCLATE 50 MG/1
324 CAPSULE, GELATIN COATED ORAL EVERY OTHER DAY
COMMUNITY
End: 2022-05-25 | Stop reason: SDUPTHER

## 2022-04-13 RX ORDER — PEPPERMINT OIL 90 MG
2 CAPSULE, DELAYED, AND EXTENDED RELEASE ORAL AS NEEDED
COMMUNITY
Start: 2022-02-22

## 2022-04-13 NOTE — PATIENT INSTRUCTIONS
Keep blood pressure log, in AM after medications and in evening, daily.  Call with log in one week or bring to appointment with Dr. Chapman

## 2022-04-13 NOTE — PROGRESS NOTES
[facility] Follow Up Office Note     Encounter Date:22  Patient:Carlos Alan  :1955  MRN:[id number]      Chief Complaint:   Chief Complaint   Patient presents with   • Atrial Fibrillation     Hospital f/u         History of Presenting Illness:        Carlos Alan is a 66 y.o. male who is here for hospital follow-up of atrial flutter.  He is a patient of Dr. Chapman.    Patient has a past medical history that is significant for coronary artery disease status post drug-eluting stent to mid RCA, hypertension, hyperlipidemia, and remote history of atrial fibrillation in  not on anticoagulation.    Patient seen in the ED at [facility] on 3/31/2022 for atrial flutter with RVR.  He was started on apixaban and digoxin.  His metoprolol dose was also increased.  Prior to discharge from ED he converted to atrial fibrillation with controlled rate.  Echocardiogram without significant abnormalities.    Patient is here today for follow-up.  He reports he has been doing well since hospital discharge.  He did notice palpitations on and off for the first couple days after discharge, but this has now resolved.  He had a root canal performed on blood thinner with no bleeding complications. Has had GI bleeding in the past, denies blood in stool or black stool at this time.    Review of Systems:  Review of Systems   Cardiovascular: Negative for chest pain, dyspnea on exertion, leg swelling, orthopnea and palpitations.   Respiratory: Negative for shortness of breath.        Current Outpatient Medications on File Prior to Visit   Medication Sig Dispense Refill   • apixaban (ELIQUIS) 5 MG tablet tablet Take 1 tablet by mouth Every 12 (Twelve) Hours. Indications: Atrial Fibrillation 60 tablet 3   • aspirin 81 MG EC tablet Take 81 mg by mouth.     • atorvastatin (LIPITOR) 80 MG tablet Take 80 mg by mouth.     • B Complex-C (B-COMPLEX WITH VITAMIN C) tablet      • Cyanocobalamin ER 1000 MCG tablet  controlled-release Take 1 tablet by mouth.     • digoxin (LANOXIN) 125 MCG tablet Take 1 tablet by mouth Daily. 30 tablet 1   • famotidine (PEPCID) 20 MG tablet Take 40 mg by mouth.     • ferrous gluconate (FERGON) 324 MG tablet Take 324 mg by mouth Every Other Day.     • ferrous sulfate 325 (65 Fe) MG tablet      • IBgard 90 MG capsule controlled-release      • metoprolol succinate XL (TOPROL-XL) 100 MG 24 hr tablet Take 1 tablet by mouth Daily. 90 tablet 1   • nitroglycerin (NITROSTAT) 0.4 MG SL tablet Place 0.4 mg under the tongue.     • pantoprazole (PROTONIX) 40 MG EC tablet Take 1 tablet by mouth.     • Peppermint Oil (IBgard) 90 MG capsule controlled-release Take 2 capsules by mouth.     • polycarbophil 625 MG tablet tablet Take 2 capsules by mouth 3 (Three) Times a Day.     • predniSONE (DELTASONE) 20 MG tablet Take 20 mg by mouth.       No current facility-administered medications on file prior to visit.       No Known Allergies    Past Medical History:   Diagnosis Date   • Arrhythmia    • Benign hypertension    • CAD (coronary artery disease)     2/p Xience stent to mid RCA in 3/2016   • Coronary artery disease involving native coronary artery of native heart without angina pectoris    • Hyperlipidemia    • S/P drug eluting coronary stent placement        History reviewed. No pertinent surgical history.    Social History     Socioeconomic History   • Marital status:    Tobacco Use   • Smoking status: Former Smoker     Packs/day: 1.50     Years: 40.00     Pack years: 60.00     Quit date: 2012     Years since quitting: 10.2   • Smokeless tobacco: Never Used   • Tobacco comment: caffeine use occass   Substance and Sexual Activity   • Alcohol use: Yes     Alcohol/week: 14.0 standard drinks     Types: 7 Glasses of wine, 7 Shots of liquor per week     Comment: red wine daily and cocktails on the weekends   • Drug use: No   • Sexual activity: Defer       Family History   Problem Relation Age of Onset   •  "Heart attack Father    • Heart attack Sister        The following portions of the patient's history were reviewed and updated as appropriate: allergies, current medications, past family history, past medical history, past social history, past surgical history and problem list.       Objective:       Vitals:    04/13/22 0853   BP: 142/70   Pulse: 52   Weight: 94.3 kg (208 lb)   Height: 182.9 cm (72\")         Physical Exam:  Constitutional: Well appearing, well developed, no acute distress   HENT: Oropharynx clear and membrane moist  Eyes: Normal conjunctiva, no sclera icterus  Neck: Supple, no carotid bruit bilaterally  Cardiovascular: Regular rate and rhythm, No Murmur, No bilateral lower extremity edema  Pulmonary: Normal respiratory effort, normal lung sounds, no wheezing  Neurological: Alert and orient x 3  Skin: Warm, dry, no ecchymosis, no rash  Psych: Appropriate mood and affect. Normal judgment and insight         Lab Results   Component Value Date     04/01/2022     03/31/2022    K 4.0 04/01/2022    K 4.2 03/31/2022     (H) 04/01/2022     03/31/2022    CO2 21.0 (L) 04/01/2022    CO2 21.8 (L) 03/31/2022    BUN 14 04/01/2022    BUN 18 03/31/2022    CREATININE 0.97 04/01/2022    CREATININE 1.18 03/31/2022    GLUCOSE 88 04/01/2022    GLUCOSE 110 (H) 03/31/2022    CALCIUM 8.6 04/01/2022    CALCIUM 9.8 03/31/2022    ALBUMIN 4.50 03/31/2022    BILITOT 1.1 03/31/2022    AST 28 03/31/2022    ALT 33 03/31/2022     Lab Results   Component Value Date    WBC 5.31 04/01/2022    WBC 6.26 03/31/2022    HGB 12.6 (L) 04/01/2022    HGB 13.7 03/31/2022    HCT 36.6 (L) 04/01/2022    HCT 40.6 03/31/2022    MCV 89.9 04/01/2022    MCV 90.6 03/31/2022     04/01/2022     03/31/2022     No results found for: CHOL, TRIG, HDL, LDL  Lab Results   Component Value Date    PROBNP 2,109.0 (H) 03/31/2022     Lab Results   Component Value Date    TROPONINT <0.010 03/31/2022     Lab Results   Component " Value Date    TSH 3.080 03/31/2022           ECG 12 Lead    Date/Time: 4/13/2022 9:12 AM  Performed by: Lindsay Ramirez APRN  Authorized by: Lindsay Ramirez APRN   Comparison: compared with previous ECG from 4/1/2022  Rhythm: sinus rhythm  Rate: normal  QRS axis: normal  Other findings: non-specific ST-T wave changes        Echocardiogram 4/1/2022:  · Calculated left ventricular EF = 62.6% Estimated left ventricular EF was in agreement with the calculated left ventricular EF. Left ventricular systolic function is normal. Normal left ventricular cavity size and wall thickness noted. All left ventricular wall segments contract normally. Left ventricular diastolic function was indeterminate.  · The left atrial cavity is borderline dilated.  · Trace tricuspid valve regurgitation is present. Estimated right ventricular systolic pressure from tricuspid regurgitation is mildly elevated (35-45 mmHg).    Left heart catheterization 3/22/2016 in setting of STEMI:  · Mid RCA with large thrombus burden 99 to 100% occluded status post drug-eluting stent.  Patient has left to right collaterals to PDA  · Heavily calcified mid LAD with no significant stenosis       Assessment:          Diagnosis Plan   1. Paroxysmal atrial fibrillation (HCC)  ECG 12 Lead   2. Atrial flutter with rapid ventricular response (HCC)     3. Coronary artery disease involving native coronary artery of native heart without angina pectoris     4. S/P drug eluting coronary stent placement     5. Essential hypertension     6. Mixed hyperlipidemia            Plan:       Paroxysmal atrial flutter with RVR//paroxysmal atrial fibrillation:  · Patient has a remote history of atrial fibrillation going back to 1993.  No recent documented episodes until 3/31 when patient presented to ED in atrial flutter with RVR  · Converted to atrial fibrillation with controlled rate prior to discharge.  Today patient is in sinus rhythm at rate 54 bpm  · Now on digoxin,  increased metoprolol dose and apixaban. [id number]: 2.  Denies bleeding issues.  Normal serum creatinine 4/1/2022  · He has questions about using fungal shampoo and alcohol based on package inserts of his new medications, apixaban and digoxin.  I did let him know I am okay with him using the shampoo as it is topical and he washes it off, so I feel that it is unlikely to have significant systemic absorption.  I also let him know alcohol consumption increases likelihood of A. fib and can also increase risk of bleeding on apixaban    Coronary artery disease status post drug-eluting stent to RCA:  · Denies anginal symptoms  · Echocardiogram 3/31/2022 reviewed.  Normal LVEF with no wall motion abnormalities.  Normal valvular function    Essential hypertension:  · BP is elevated 142/70 today.  His amlodipine and lisinopril were stopped during recent ED visit  · I asked for patient to keep a log of blood pressure twice daily after medications and either call in 1 week with log or bring to appointment with Dr. Chapman on 5/4/2022 so we can better assess average blood pressure range    Mixed hyperlipidemia:  · Reviewed lipid panel 2/2022 at [facility].  LDL is 86 and not at goal less than 70, also higher than 70 on previous lab work  · Discussed with patient option to try to modify lifestyle versus add Zetia.  He would like to start with lifestyle modifications    Overall Mr. Morrison seems to be doing well since his recent hospital discharge.  Today he is in sinus rhythm, but notes he was having palpitations on and off for the first couple of days after leaving the hospital.  He denies bleeding issues on apixaban.  He is tolerating digoxin and Toprol at current doses.  His heart rate today is 52.  He does not report any increased fatigue.  He will see Dr. Chapman again on 5/4/2022.  He will keep a blood pressure log and either call or bring to his next appointment so we can decide if we need to resume either amlodipine or  lisinopril which were stopped when seen in the ED.        Orders Placed This Encounter   Procedures   • ECG 12 Lead     This order was created via procedure documentation     Order Specific Question:   Release to patient     Answer:   Immediate        SIMON Almanzar  [facility]  04/13/22  09:49 EDT

## 2022-05-25 ENCOUNTER — OFFICE VISIT (OUTPATIENT)
Dept: CARDIOLOGY | Facility: CLINIC | Age: 67
End: 2022-05-25

## 2022-05-25 VITALS
HEART RATE: 56 BPM | SYSTOLIC BLOOD PRESSURE: 110 MMHG | WEIGHT: 204 LBS | HEIGHT: 72 IN | BODY MASS INDEX: 27.63 KG/M2 | DIASTOLIC BLOOD PRESSURE: 74 MMHG

## 2022-05-25 DIAGNOSIS — I48.0 PAROXYSMAL ATRIAL FIBRILLATION: Primary | ICD-10-CM

## 2022-05-25 DIAGNOSIS — I25.10 CORONARY ARTERY DISEASE INVOLVING NATIVE CORONARY ARTERY OF NATIVE HEART WITHOUT ANGINA PECTORIS: ICD-10-CM

## 2022-05-25 DIAGNOSIS — I10 ESSENTIAL HYPERTENSION: ICD-10-CM

## 2022-05-25 DIAGNOSIS — E78.2 MIXED HYPERLIPIDEMIA: ICD-10-CM

## 2022-05-25 PROCEDURE — 99214 OFFICE O/P EST MOD 30 MIN: CPT | Performed by: INTERNAL MEDICINE

## 2022-05-25 PROCEDURE — 93000 ELECTROCARDIOGRAM COMPLETE: CPT | Performed by: INTERNAL MEDICINE

## 2022-05-25 RX ORDER — DIGOXIN 125 MCG
125 TABLET ORAL
Qty: 90 TABLET | Refills: 3 | Status: SHIPPED | OUTPATIENT
Start: 2022-05-25

## 2022-05-25 NOTE — PROGRESS NOTES
[facility] Follow Up Office Note     Encounter Date:22  Patient:Carlos Alan  :1955  MRN:[id number]      Chief Complaint: Follow-up coronary artery disease    Chief Complaint   Patient presents with   • Coronary Artery Disease     1 year f/u   • PAF     History of Presenting Illness:      Mr. Alan is a 66 y.o. gentleman with past medical history notable for coronary artery disease status post percutaneous intervention, hypertension, mixed hyperlipidemia, and remote history of paroxysmal atrial fibrillation who presents to our office for scheduled follow-up.  Overall patient is doing well after his recent hospitalization.  He did present to the hospital with paroxysmal atrial fibrillation/flutter but fortunately converted out.  Since leaving the hospital he has had a few episodes of palpitations but in general was doing well      Review of Systems:  Review of Systems   Constitutional: Negative.   HENT: Negative.    Eyes: Negative.    Cardiovascular: Positive for palpitations.   Respiratory: Negative.    Endocrine: Negative.    Hematologic/Lymphatic: Negative.    Skin: Negative.    Musculoskeletal: Negative.    Gastrointestinal: Negative.    Genitourinary: Negative.    Neurological: Negative.    Psychiatric/Behavioral: Negative.    Allergic/Immunologic: Negative.        Current Outpatient Medications on File Prior to Visit   Medication Sig Dispense Refill   • aspirin 81 MG EC tablet Take 81 mg by mouth.     • atorvastatin (LIPITOR) 80 MG tablet Take 80 mg by mouth.     • Cyanocobalamin ER 1000 MCG tablet controlled-release Take 1 tablet by mouth.     • famotidine (PEPCID) 20 MG tablet Take 40 mg by mouth As Needed.     • ferrous sulfate 325 (65 Fe) MG tablet      • metoprolol succinate XL (TOPROL-XL) 100 MG 24 hr tablet Take 1 tablet by mouth Daily. 90 tablet 1   • pantoprazole (PROTONIX) 40 MG EC tablet Take 1 tablet by mouth.     • Peppermint Oil (IBgard) 90 MG capsule controlled-release  Take 2 capsules by mouth As Needed.     • polycarbophil 625 MG tablet tablet Take 2 capsules by mouth 3 (Three) Times a Day.     • predniSONE (DELTASONE) 20 MG tablet Take 20 mg by mouth As Needed.     • [DISCONTINUED] apixaban (ELIQUIS) 5 MG tablet tablet Take 1 tablet by mouth Every 12 (Twelve) Hours. Indications: Atrial Fibrillation 60 tablet 3   • [DISCONTINUED] digoxin (LANOXIN) 125 MCG tablet Take 1 tablet by mouth Daily. 30 tablet 1   • B Complex-C (B-COMPLEX WITH VITAMIN C) tablet      • ferrous gluconate (FERGON) 324 MG tablet Take 324 mg by mouth Every Other Day.     • IBgard 90 MG capsule controlled-release      • nitroglycerin (NITROSTAT) 0.4 MG SL tablet Place 0.4 mg under the tongue.       No current facility-administered medications on file prior to visit.       No Known Allergies    Past Medical History:   Diagnosis Date   • Arrhythmia    • Benign hypertension    • CAD (coronary artery disease)     2/p Xience stent to mid RCA in 3/2016   • Coronary artery disease involving native coronary artery of native heart without angina pectoris    • Hyperlipidemia    • S/P drug eluting coronary stent placement        History reviewed. No pertinent surgical history.    Social History     Socioeconomic History   • Marital status: Single   Tobacco Use   • Smoking status: Former Smoker     Packs/day: 1.50     Years: 40.00     Pack years: 60.00     Quit date: 2012     Years since quitting: 10.4   • Smokeless tobacco: Never Used   • Tobacco comment: caffeine use occass   Substance and Sexual Activity   • Alcohol use: Yes     Alcohol/week: 14.0 standard drinks     Types: 7 Glasses of wine, 7 Shots of liquor per week     Comment: red wine daily and cocktails on the weekends   • Drug use: No   • Sexual activity: Defer       Family History   Problem Relation Age of Onset   • Heart attack Father    • Heart attack Sister        The following portions of the patient's history were reviewed and updated as appropriate:  "allergies, current medications, past family history, past medical history, past social history, past surgical history and problem list.       Objective:       Vitals:    05/25/22 0901   BP: 110/74   BP Location: Left arm   Patient Position: Sitting   Pulse: 56   Weight: 92.5 kg (204 lb)   Height: 182.9 cm (72\")     Body mass index is 27.67 kg/m².     Physical Exam:  Constitutional: Well appearing, well developed, no acute distress   HENT: Oropharynx clear and membrane moist  Eyes: Normal conjunctiva, no sclera icterus.  Neck: Supple, no carotid bruit bilaterally.  Cardiovascular: Regular rate and rhythm, no murmur, no lower extremity edema.  Pulmonary: Normal respiratory effort, no lung sounds, no wheezing.  Abdominal: Soft, nontender, no hepatosplenomegaly, liver is non-pulsatile.  Neurological: Alert and orient x 3.   Skin: Warm, dry, no ecchymosis, no rash.  Psych: Appropriate mood and affect. Normal judgment and insight.         ECG 12 Lead    Date/Time: 5/25/2022 9:21 AM  Performed by: Young Chapman MD  Authorized by: Young Chapman MD   Comparison: compared with previous ECG from 4/13/2022  Similar to previous ECG  Rhythm: sinus rhythm        Echocardiogram 4/1/2022 with images reviewed by myself:  · Calculated left ventricular EF = 62.6% Estimated left ventricular EF was in agreement with the calculated left ventricular EF. Left ventricular systolic function is normal. Normal left ventricular cavity size and wall thickness noted. All left ventricular wall segments contract normally. Left ventricular diastolic function was indeterminate.  · The left atrial cavity is borderline dilated.  · Trace tricuspid valve regurgitation is present. Estimated right ventricular systolic pressure from tricuspid regurgitation is mildly elevated (35-45 mmHg).    Cardiac catheterization [facility] 3/22/2016:  · Left main angiographically normal  · LAD luminal irregularities  · Circumflex luminal " irregularities  · Right coronary artery 99% stenoses with ARMEN II flow, culprit for unstable angina  · Successful percutaneous intervention with placement of 4.0 x 20 mm Promus drug-eluting stent    Echocardiogram [facility] 2/23/2018:  · Ejection fraction 60%  · Moderate mitral regurgitation  · Right ventricular systolic pressure estimated at 30 to 40%       Assessment:          Diagnosis Plan   1. Paroxysmal atrial fibrillation (HCC)  ECG 12 Lead   2. Coronary artery disease involving native coronary artery of native heart without angina pectoris     3. Essential hypertension     4. Mixed hyperlipidemia          Plan:       Mr. Alan is a 66 y.o. gentleman with past medical history notable for coronary artery disease status post percutaneous intervention, hypertension, mixed hyperlipidemia, and remote history of paroxysmal atrial fibrillation who presents to our office for scheduled follow-up.  Overall patient doing well.  We will continue this current medical regimen.  No changes needed this time.  We will plan on seeing him back in 6 months however patient is planning on moving to [place] near [facility].  Did recommend considering the [facility] cardiology group to establish with once he moves but would be happy to take care of him up until he is seen by them      Coronary artery disease without angina:  · Continue aspirin  · Continue beta-blocker  · Continue high potency statin    Paroxysmal atrial fibrillation:  · Remote history from 1993 however recurrent episode 4/2022 requiring hospitalization  · Continue metoprolol and digoxin  · CHADVASC score 2 and will continue Eliquis  · Does have a remote history of GI bleed    Hypertension:  · Blood pressure is well controlled continue current medical therapy    Mixed hyperlipidemia:  · Can continue high potency statin therapy  · LDL reasonably controlled at 86 on 2/2022    Follow-up:  6months        Young Chapman MD  [facility]  05/25/22  09:24 EDT